# Patient Record
Sex: MALE | Race: WHITE | NOT HISPANIC OR LATINO | Employment: OTHER | URBAN - METROPOLITAN AREA
[De-identification: names, ages, dates, MRNs, and addresses within clinical notes are randomized per-mention and may not be internally consistent; named-entity substitution may affect disease eponyms.]

---

## 2017-04-10 PROBLEM — I50.32 CHRONIC DIASTOLIC CHF (CONGESTIVE HEART FAILURE): Status: ACTIVE | Noted: 2017-04-10

## 2017-04-10 PROBLEM — G47.33 OBSTRUCTIVE SLEEP APNEA: Status: ACTIVE | Noted: 2017-04-10

## 2017-04-10 PROBLEM — E78.5 DYSLIPIDEMIA: Status: ACTIVE | Noted: 2017-04-10

## 2017-04-10 PROBLEM — E11.9 TYPE 2 DIABETES MELLITUS: Status: ACTIVE | Noted: 2017-04-10

## 2017-04-10 PROBLEM — I25.10 CORONARY ARTERY DISEASE: Status: ACTIVE | Noted: 2017-04-10

## 2017-04-10 PROBLEM — I10 ESSENTIAL (PRIMARY) HYPERTENSION: Status: ACTIVE | Noted: 2017-04-10

## 2017-04-10 PROBLEM — T31.10 BURN (ANY DEGREE) INVOLVING 10-19% OF BODY SURFACE: Status: ACTIVE | Noted: 2017-04-10

## 2017-04-10 PROBLEM — Z79.01 ANTICOAGULANT LONG-TERM USE: Status: ACTIVE | Noted: 2017-04-10

## 2017-04-10 PROBLEM — L03.116 CELLULITIS OF LEFT FOOT: Status: ACTIVE | Noted: 2017-04-10

## 2017-04-10 PROBLEM — I48.91 ATRIAL FIBRILLATION: Status: ACTIVE | Noted: 2017-04-10

## 2017-04-10 PROBLEM — E66.01 MORBID OBESITY WITH BMI OF 40.0-44.9, ADULT: Status: ACTIVE | Noted: 2017-04-10

## 2017-09-07 PROBLEM — L60.0 INGROWN LEFT GREATER TOENAIL: Status: ACTIVE | Noted: 2017-09-07

## 2017-09-13 PROBLEM — T81.89XA NONHEALING SURGICAL WOUND: Status: ACTIVE | Noted: 2017-09-13

## 2017-09-14 PROBLEM — I89.0 LYMPHEDEMA OF BOTH LOWER EXTREMITIES: Status: ACTIVE | Noted: 2017-09-14

## 2017-09-14 PROBLEM — T81.89XA NON-HEALING SURGICAL WOUND: Status: ACTIVE | Noted: 2017-09-14

## 2017-12-28 PROBLEM — I50.33 DIASTOLIC DYSFUNCTION WITH ACUTE ON CHRONIC HEART FAILURE: Status: ACTIVE | Noted: 2017-04-10

## 2017-12-28 PROBLEM — R06.00 DYSPNEA: Status: ACTIVE | Noted: 2017-12-28

## 2017-12-28 PROBLEM — J45.42 MODERATE PERSISTENT ASTHMA WITH STATUS ASTHMATICUS: Status: ACTIVE | Noted: 2017-12-28

## 2018-04-18 PROBLEM — R05.9 COUGH: Status: ACTIVE | Noted: 2018-04-18

## 2018-05-14 PROBLEM — L60.0 INGROWN LEFT GREATER TOENAIL: Status: RESOLVED | Noted: 2017-09-07 | Resolved: 2018-05-14

## 2018-05-14 PROBLEM — L03.116 CELLULITIS OF LEFT FOOT: Status: RESOLVED | Noted: 2017-04-10 | Resolved: 2018-05-14

## 2018-05-14 PROBLEM — T81.89XA NONHEALING SURGICAL WOUND: Status: RESOLVED | Noted: 2017-09-13 | Resolved: 2018-05-14

## 2018-05-14 PROBLEM — T31.10 BURN (ANY DEGREE) INVOLVING 10-19% OF BODY SURFACE: Status: RESOLVED | Noted: 2017-04-10 | Resolved: 2018-05-14

## 2018-05-14 PROBLEM — T81.89XA NON-HEALING SURGICAL WOUND: Status: RESOLVED | Noted: 2017-09-14 | Resolved: 2018-05-14

## 2018-06-27 PROBLEM — T50.905A DRUG-INDUCED GYNECOMASTIA: Status: ACTIVE | Noted: 2018-06-27

## 2018-06-27 PROBLEM — N62 DRUG-INDUCED GYNECOMASTIA: Status: ACTIVE | Noted: 2018-06-27

## 2018-10-16 PROBLEM — R63.8 ALTERATION IN NUTRITION: Status: ACTIVE | Noted: 2018-10-16

## 2018-11-05 PROBLEM — J45.52 SEVERE PERSISTENT ASTHMA WITH STATUS ASTHMATICUS: Status: ACTIVE | Noted: 2017-12-28

## 2018-11-05 PROBLEM — R06.09 DYSPNEA ON EXERTION: Status: ACTIVE | Noted: 2018-11-05

## 2018-11-12 PROBLEM — R93.89 ABNORMAL CXR: Status: ACTIVE | Noted: 2018-11-12

## 2018-11-14 PROBLEM — D64.9 NORMOCYTIC ANEMIA: Status: ACTIVE | Noted: 2018-11-14

## 2018-11-21 PROBLEM — R79.9 ELEVATED BUN: Status: ACTIVE | Noted: 2018-11-21

## 2018-11-23 PROBLEM — J45.52 SEVERE PERSISTENT ASTHMA WITH STATUS ASTHMATICUS: Status: RESOLVED | Noted: 2017-12-28 | Resolved: 2018-11-23

## 2019-01-03 PROBLEM — R79.9 ELEVATED BUN: Status: RESOLVED | Noted: 2018-11-21 | Resolved: 2019-01-03

## 2019-01-03 PROBLEM — R93.89 ABNORMAL CXR: Status: RESOLVED | Noted: 2018-11-12 | Resolved: 2019-01-03

## 2019-01-09 PROBLEM — J45.902 STATUS ASTHMATICUS: Status: ACTIVE | Noted: 2019-01-09

## 2019-01-11 PROBLEM — J20.9 ACUTE BRONCHITIS WITH BRONCHOSPASM: Status: ACTIVE | Noted: 2019-01-09

## 2019-01-31 PROBLEM — R63.8 ALTERATION IN NUTRITION: Status: RESOLVED | Noted: 2018-10-16 | Resolved: 2019-01-31

## 2019-04-23 PROBLEM — J45.41 MODERATE PERSISTENT ASTHMA WITH EXACERBATION: Status: ACTIVE | Noted: 2019-04-23

## 2019-04-30 PROBLEM — R53.1 WEAKNESS: Status: ACTIVE | Noted: 2019-04-30

## 2019-05-21 PROBLEM — E11.42 TYPE 2 DIABETES MELLITUS WITH DIABETIC POLYNEUROPATHY, WITH LONG-TERM CURRENT USE OF INSULIN: Status: ACTIVE | Noted: 2017-04-10

## 2019-05-21 PROBLEM — Z79.4 TYPE 2 DIABETES MELLITUS WITH DIABETIC POLYNEUROPATHY, WITH LONG-TERM CURRENT USE OF INSULIN: Status: ACTIVE | Noted: 2017-04-10

## 2019-05-29 PROBLEM — R53.1 WEAKNESS: Status: RESOLVED | Noted: 2019-04-30 | Resolved: 2019-05-29

## 2019-05-29 PROBLEM — R06.00 DYSPNEA: Status: RESOLVED | Noted: 2017-12-28 | Resolved: 2019-05-29

## 2019-06-12 PROBLEM — J45.901 EXACERBATION OF ASTHMA: Status: ACTIVE | Noted: 2019-06-12

## 2019-07-22 PROBLEM — B37.2 CUTANEOUS CANDIDIASIS: Status: ACTIVE | Noted: 2019-07-22

## 2019-08-06 PROBLEM — J96.02 ACUTE RESPIRATORY FAILURE WITH HYPERCAPNIA: Status: ACTIVE | Noted: 2019-08-06

## 2019-08-07 PROBLEM — L08.1 ERYTHRASMA: Status: ACTIVE | Noted: 2019-08-07

## 2019-09-03 PROBLEM — R13.10 DYSPHAGIA: Status: ACTIVE | Noted: 2019-09-03

## 2019-09-03 PROBLEM — J45.901 ASTHMA WITH ACUTE EXACERBATION: Status: ACTIVE | Noted: 2019-09-03

## 2019-09-04 PROBLEM — R93.3 ABNORMAL BARIUM SWALLOW: Status: ACTIVE | Noted: 2019-09-04

## 2019-09-04 PROBLEM — R06.03 ACUTE RESPIRATORY DISTRESS: Status: ACTIVE | Noted: 2019-09-04

## 2019-09-05 PROBLEM — J45.901 ASTHMA WITH ACUTE EXACERBATION: Status: ACTIVE | Noted: 2019-09-05

## 2019-09-06 PROBLEM — E87.70 VOLUME OVERLOAD: Status: ACTIVE | Noted: 2019-09-06

## 2019-09-06 PROBLEM — E66.2 OBESITY HYPOVENTILATION SYNDROME: Status: ACTIVE | Noted: 2019-09-06

## 2019-09-09 PROBLEM — R06.03 ACUTE RESPIRATORY DISTRESS: Status: RESOLVED | Noted: 2019-09-04 | Resolved: 2019-09-09

## 2019-09-09 PROBLEM — R06.09 DYSPNEA ON EXERTION: Status: RESOLVED | Noted: 2018-11-05 | Resolved: 2019-09-09

## 2019-09-09 PROBLEM — J20.9 ACUTE BRONCHITIS WITH BRONCHOSPASM: Status: RESOLVED | Noted: 2019-01-09 | Resolved: 2019-09-09

## 2019-09-09 PROBLEM — J45.901 EXACERBATION OF ASTHMA: Status: RESOLVED | Noted: 2019-06-12 | Resolved: 2019-09-09

## 2019-09-09 PROBLEM — B37.2 CUTANEOUS CANDIDIASIS: Status: RESOLVED | Noted: 2019-07-22 | Resolved: 2019-09-09

## 2019-09-09 PROBLEM — J96.02 ACUTE RESPIRATORY FAILURE WITH HYPERCAPNIA: Status: RESOLVED | Noted: 2019-08-06 | Resolved: 2019-09-09

## 2019-09-09 PROBLEM — R63.8 ALTERATION IN NUTRITION: Status: RESOLVED | Noted: 2018-10-16 | Resolved: 2019-09-09

## 2019-09-09 PROBLEM — E87.70 VOLUME OVERLOAD: Status: RESOLVED | Noted: 2019-09-06 | Resolved: 2019-09-09

## 2019-09-09 PROBLEM — L08.1 ERYTHRASMA: Status: RESOLVED | Noted: 2019-08-07 | Resolved: 2019-09-09

## 2019-09-09 PROBLEM — J45.901 ASTHMA WITH ACUTE EXACERBATION: Status: RESOLVED | Noted: 2019-09-05 | Resolved: 2019-09-09

## 2019-09-20 PROBLEM — I48.20 CHRONIC ATRIAL FIBRILLATION: Status: ACTIVE | Noted: 2017-04-10

## 2019-09-24 PROBLEM — N28.9 ACUTE RENAL INSUFFICIENCY: Status: ACTIVE | Noted: 2019-09-24

## 2019-09-24 PROBLEM — R06.02 SOB (SHORTNESS OF BREATH): Status: ACTIVE | Noted: 2019-09-24

## 2019-09-24 PROBLEM — R06.03 RESPIRATORY DISTRESS: Status: ACTIVE | Noted: 2019-09-24

## 2019-09-30 PROBLEM — I50.33 DIASTOLIC DYSFUNCTION WITH ACUTE ON CHRONIC HEART FAILURE: Chronic | Status: ACTIVE | Noted: 2017-04-10

## 2019-09-30 PROBLEM — I10 ESSENTIAL (PRIMARY) HYPERTENSION: Chronic | Status: ACTIVE | Noted: 2017-04-10

## 2019-09-30 PROBLEM — G47.33 OBSTRUCTIVE SLEEP APNEA: Chronic | Status: ACTIVE | Noted: 2017-04-10

## 2019-09-30 PROBLEM — E78.5 DYSLIPIDEMIA: Chronic | Status: ACTIVE | Noted: 2017-04-10

## 2019-09-30 PROBLEM — Z79.4 TYPE 2 DIABETES MELLITUS WITH DIABETIC POLYNEUROPATHY, WITH LONG-TERM CURRENT USE OF INSULIN: Chronic | Status: ACTIVE | Noted: 2017-04-10

## 2019-09-30 PROBLEM — J45.41 MODERATE PERSISTENT ASTHMA WITH EXACERBATION: Chronic | Status: ACTIVE | Noted: 2019-04-23

## 2019-09-30 PROBLEM — I89.0 LYMPHEDEMA OF BOTH LOWER EXTREMITIES: Chronic | Status: ACTIVE | Noted: 2017-09-14

## 2019-09-30 PROBLEM — E66.01 MORBID OBESITY WITH BODY MASS INDEX (BMI) OF 50.0 TO 59.9 IN ADULT: Chronic | Status: ACTIVE | Noted: 2017-04-10

## 2019-09-30 PROBLEM — E11.42 TYPE 2 DIABETES MELLITUS WITH DIABETIC POLYNEUROPATHY, WITH LONG-TERM CURRENT USE OF INSULIN: Chronic | Status: ACTIVE | Noted: 2017-04-10

## 2019-10-02 PROBLEM — J45.40 MODERATE PERSISTENT ASTHMA WITHOUT COMPLICATION: Status: ACTIVE | Noted: 2019-04-23

## 2019-10-14 ENCOUNTER — HOSPITAL ENCOUNTER (OUTPATIENT)
Facility: HOSPITAL | Age: 59
Discharge: HOME | End: 2019-10-15
Attending: SURGERY | Admitting: INTERNAL MEDICINE
Payer: MEDICARE

## 2019-10-14 ENCOUNTER — TELEPHONE (OUTPATIENT)
Dept: GASTROENTEROLOGY | Facility: CLINIC | Age: 59
End: 2019-10-14

## 2019-10-14 DIAGNOSIS — J44.1 COPD WITH ACUTE EXACERBATION: ICD-10-CM

## 2019-10-14 DIAGNOSIS — R07.89 CHEST WALL PAIN: Primary | ICD-10-CM

## 2019-10-14 DIAGNOSIS — J96.12 CHRONIC RESPIRATORY FAILURE WITH HYPOXIA AND HYPERCAPNIA: ICD-10-CM

## 2019-10-14 DIAGNOSIS — I50.33 DIASTOLIC DYSFUNCTION WITH ACUTE ON CHRONIC HEART FAILURE: Chronic | ICD-10-CM

## 2019-10-14 DIAGNOSIS — I25.10 CARDIOVASCULAR DISEASE: ICD-10-CM

## 2019-10-14 DIAGNOSIS — R79.89 ELEVATED TROPONIN: ICD-10-CM

## 2019-10-14 DIAGNOSIS — E66.2 OBESITY HYPOVENTILATION SYNDROME: ICD-10-CM

## 2019-10-14 DIAGNOSIS — R06.02 SOB (SHORTNESS OF BREATH): ICD-10-CM

## 2019-10-14 DIAGNOSIS — J96.11 CHRONIC RESPIRATORY FAILURE WITH HYPOXIA AND HYPERCAPNIA: ICD-10-CM

## 2019-10-14 PROBLEM — E66.01 MORBID OBESITY: Status: ACTIVE | Noted: 2019-09-06

## 2019-10-14 LAB
ALBUMIN SERPL BCP-MCNC: 3.4 G/DL (ref 3.5–5.2)
ALLENS TEST: ABNORMAL
ALP SERPL-CCNC: 61 U/L (ref 55–135)
ALT SERPL W/O P-5'-P-CCNC: 18 U/L (ref 10–44)
ANION GAP SERPL CALC-SCNC: 9 MMOL/L (ref 8–16)
APTT BLDCRRT: 30.3 SEC (ref 21–32)
AST SERPL-CCNC: 14 U/L (ref 10–40)
BASOPHILS # BLD AUTO: 0.03 K/UL (ref 0–0.2)
BASOPHILS NFR BLD: 0.4 % (ref 0–1.9)
BILIRUB SERPL-MCNC: 0.5 MG/DL (ref 0.1–1)
BNP SERPL-MCNC: 49 PG/ML (ref 0–99)
BUN SERPL-MCNC: 17 MG/DL (ref 6–20)
CALCIUM SERPL-MCNC: 8.7 MG/DL (ref 8.7–10.5)
CHLORIDE SERPL-SCNC: 104 MMOL/L (ref 95–110)
CK MB SERPL-MCNC: 1.5 NG/ML (ref 0.1–6.5)
CK MB SERPL-MCNC: 1.7 NG/ML (ref 0.1–6.5)
CK MB SERPL-RTO: 2.3 % (ref 0–5)
CK MB SERPL-RTO: 2.4 % (ref 0–5)
CK SERPL-CCNC: 62 U/L (ref 20–200)
CK SERPL-CCNC: 62 U/L (ref 20–200)
CK SERPL-CCNC: 73 U/L (ref 20–200)
CK SERPL-CCNC: 73 U/L (ref 20–200)
CO2 SERPL-SCNC: 29 MMOL/L (ref 23–29)
CREAT SERPL-MCNC: 0.9 MG/DL (ref 0.5–1.4)
D DIMER PPP IA.FEU-MCNC: 0.32 MG/L FEU
DELSYS: ABNORMAL
DIFFERENTIAL METHOD: ABNORMAL
EOSINOPHIL # BLD AUTO: 0.1 K/UL (ref 0–0.5)
EOSINOPHIL NFR BLD: 1 % (ref 0–8)
ERYTHROCYTE [DISTWIDTH] IN BLOOD BY AUTOMATED COUNT: 14.8 % (ref 11.5–14.5)
EST. GFR  (AFRICAN AMERICAN): >60 ML/MIN/1.73 M^2
EST. GFR  (NON AFRICAN AMERICAN): >60 ML/MIN/1.73 M^2
GLUCOSE SERPL-MCNC: 171 MG/DL (ref 70–110)
HCO3 UR-SCNC: 34.5 MMOL/L (ref 22–26)
HCT VFR BLD AUTO: 37.3 % (ref 40–54)
HGB BLD-MCNC: 11.5 G/DL (ref 14–18)
IMM GRANULOCYTES # BLD AUTO: 0.07 K/UL (ref 0–0.04)
IMM GRANULOCYTES NFR BLD AUTO: 0.9 % (ref 0–0.5)
INR PPP: 1 (ref 0.8–1.2)
LYMPHOCYTES # BLD AUTO: 1.8 K/UL (ref 1–4.8)
LYMPHOCYTES NFR BLD: 23.4 % (ref 18–48)
MCH RBC QN AUTO: 25.8 PG (ref 27–31)
MCHC RBC AUTO-ENTMCNC: 30.8 G/DL (ref 32–36)
MCV RBC AUTO: 84 FL (ref 82–98)
MONOCYTES # BLD AUTO: 0.5 K/UL (ref 0.3–1)
MONOCYTES NFR BLD: 6.5 % (ref 4–15)
NEUTROPHILS # BLD AUTO: 5.3 K/UL (ref 1.8–7.7)
NEUTROPHILS NFR BLD: 67.8 % (ref 38–73)
NRBC BLD-RTO: 0 /100 WBC
PCO2 BLDA: 64 MMHG (ref 35–45)
PH SMN: 7.34 [PH] (ref 7.35–7.45)
PLATELET # BLD AUTO: 224 K/UL (ref 150–350)
PMV BLD AUTO: 10.5 FL (ref 9.2–12.9)
PO2 BLDA: 24 MMHG (ref 75–100)
POC BE: 7 MMOL/L (ref -2–2)
POC COHB: 1.2 % (ref 0–3)
POC METHB: 0.1 % (ref 0–1.5)
POC O2HB ARTERIAL: 41.1 % (ref 94–100)
POC SATURATED O2: 41.6 % (ref 90–100)
POC TCO2: 36.5 MMOL/L
POC THB: 11.1 G/DL (ref 12–18)
POCT GLUCOSE: 138 MG/DL (ref 70–110)
POCT GLUCOSE: 258 MG/DL (ref 70–110)
POTASSIUM SERPL-SCNC: 3.8 MMOL/L (ref 3.5–5.1)
PROT SERPL-MCNC: 6.6 G/DL (ref 6–8.4)
PROTHROMBIN TIME: 10.3 SEC (ref 9–12.5)
RBC # BLD AUTO: 4.45 M/UL (ref 4.6–6.2)
SITE: ABNORMAL
SODIUM SERPL-SCNC: 142 MMOL/L (ref 136–145)
TROPONIN I SERPL DL<=0.01 NG/ML-MCNC: 0.12 NG/ML (ref 0–0.03)
TROPONIN I SERPL DL<=0.01 NG/ML-MCNC: 0.16 NG/ML (ref 0–0.03)
TROPONIN I SERPL DL<=0.01 NG/ML-MCNC: 0.19 NG/ML (ref 0–0.03)
WBC # BLD AUTO: 7.85 K/UL (ref 3.9–12.7)

## 2019-10-14 PROCEDURE — 85610 PROTHROMBIN TIME: CPT

## 2019-10-14 PROCEDURE — 27000221 HC OXYGEN, UP TO 24 HOURS

## 2019-10-14 PROCEDURE — 84484 ASSAY OF TROPONIN QUANT: CPT

## 2019-10-14 PROCEDURE — 96372 THER/PROPH/DIAG INJ SC/IM: CPT | Mod: 59

## 2019-10-14 PROCEDURE — 36600 WITHDRAWAL OF ARTERIAL BLOOD: CPT

## 2019-10-14 PROCEDURE — 36415 COLL VENOUS BLD VENIPUNCTURE: CPT

## 2019-10-14 PROCEDURE — 85379 FIBRIN DEGRADATION QUANT: CPT

## 2019-10-14 PROCEDURE — 94640 AIRWAY INHALATION TREATMENT: CPT

## 2019-10-14 PROCEDURE — 85025 COMPLETE CBC W/AUTO DIFF WBC: CPT

## 2019-10-14 PROCEDURE — 83880 ASSAY OF NATRIURETIC PEPTIDE: CPT

## 2019-10-14 PROCEDURE — 82550 ASSAY OF CK (CPK): CPT | Mod: 91

## 2019-10-14 PROCEDURE — 63600175 PHARM REV CODE 636 W HCPCS: Performed by: SURGERY

## 2019-10-14 PROCEDURE — 80053 COMPREHEN METABOLIC PANEL: CPT

## 2019-10-14 PROCEDURE — 25000003 PHARM REV CODE 250: Performed by: SURGERY

## 2019-10-14 PROCEDURE — 96374 THER/PROPH/DIAG INJ IV PUSH: CPT

## 2019-10-14 PROCEDURE — C9399 UNCLASSIFIED DRUGS OR BIOLOG: HCPCS | Performed by: INTERNAL MEDICINE

## 2019-10-14 PROCEDURE — 85730 THROMBOPLASTIN TIME PARTIAL: CPT

## 2019-10-14 PROCEDURE — 93010 EKG 12-LEAD: ICD-10-PCS | Mod: ,,, | Performed by: INTERNAL MEDICINE

## 2019-10-14 PROCEDURE — 25000242 PHARM REV CODE 250 ALT 637 W/ HCPCS: Performed by: SURGERY

## 2019-10-14 PROCEDURE — 25000003 PHARM REV CODE 250: Performed by: INTERNAL MEDICINE

## 2019-10-14 PROCEDURE — 93010 ELECTROCARDIOGRAM REPORT: CPT | Mod: ,,, | Performed by: INTERNAL MEDICINE

## 2019-10-14 PROCEDURE — G0378 HOSPITAL OBSERVATION PER HR: HCPCS

## 2019-10-14 PROCEDURE — 99220 PR INITIAL OBSERVATION CARE,LEVL III: ICD-10-PCS | Mod: ,,, | Performed by: INTERNAL MEDICINE

## 2019-10-14 PROCEDURE — 82553 CREATINE MB FRACTION: CPT | Mod: 91

## 2019-10-14 PROCEDURE — 82803 BLOOD GASES ANY COMBINATION: CPT | Performed by: SURGERY

## 2019-10-14 PROCEDURE — 25000242 PHARM REV CODE 250 ALT 637 W/ HCPCS: Performed by: INTERNAL MEDICINE

## 2019-10-14 PROCEDURE — 93005 ELECTROCARDIOGRAM TRACING: CPT

## 2019-10-14 PROCEDURE — 99285 EMERGENCY DEPT VISIT HI MDM: CPT | Mod: 25

## 2019-10-14 PROCEDURE — 63600175 PHARM REV CODE 636 W HCPCS: Performed by: INTERNAL MEDICINE

## 2019-10-14 PROCEDURE — 94761 N-INVAS EAR/PLS OXIMETRY MLT: CPT

## 2019-10-14 PROCEDURE — 99220 PR INITIAL OBSERVATION CARE,LEVL III: CPT | Mod: ,,, | Performed by: INTERNAL MEDICINE

## 2019-10-14 RX ORDER — HYDRALAZINE HYDROCHLORIDE 50 MG/1
50 TABLET, FILM COATED ORAL EVERY 12 HOURS
Status: DISCONTINUED | OUTPATIENT
Start: 2019-10-14 | End: 2019-10-15 | Stop reason: HOSPADM

## 2019-10-14 RX ORDER — HYDROCODONE BITARTRATE AND ACETAMINOPHEN 5; 325 MG/1; MG/1
1 TABLET ORAL EVERY 4 HOURS PRN
Status: DISCONTINUED | OUTPATIENT
Start: 2019-10-14 | End: 2019-10-15 | Stop reason: HOSPADM

## 2019-10-14 RX ORDER — GLUCAGON 1 MG
1 KIT INJECTION
Status: DISCONTINUED | OUTPATIENT
Start: 2019-10-14 | End: 2019-10-14 | Stop reason: SDUPTHER

## 2019-10-14 RX ORDER — HYDROMORPHONE HYDROCHLORIDE 1 MG/ML
2 INJECTION, SOLUTION INTRAMUSCULAR; INTRAVENOUS; SUBCUTANEOUS
Status: DISCONTINUED | OUTPATIENT
Start: 2019-10-14 | End: 2019-10-14

## 2019-10-14 RX ORDER — CARVEDILOL 12.5 MG/1
12.5 TABLET ORAL 2 TIMES DAILY
Status: DISCONTINUED | OUTPATIENT
Start: 2019-10-14 | End: 2019-10-15 | Stop reason: HOSPADM

## 2019-10-14 RX ORDER — KETOROLAC TROMETHAMINE 30 MG/ML
30 INJECTION, SOLUTION INTRAMUSCULAR; INTRAVENOUS
Status: COMPLETED | OUTPATIENT
Start: 2019-10-14 | End: 2019-10-14

## 2019-10-14 RX ORDER — FLUTICASONE FUROATE AND VILANTEROL 200; 25 UG/1; UG/1
1 POWDER RESPIRATORY (INHALATION) DAILY
Status: DISCONTINUED | OUTPATIENT
Start: 2019-10-15 | End: 2019-10-15 | Stop reason: HOSPADM

## 2019-10-14 RX ORDER — IPRATROPIUM BROMIDE AND ALBUTEROL SULFATE 2.5; .5 MG/3ML; MG/3ML
3 SOLUTION RESPIRATORY (INHALATION) EVERY 4 HOURS
Status: DISCONTINUED | OUTPATIENT
Start: 2019-10-14 | End: 2019-10-14 | Stop reason: SDUPTHER

## 2019-10-14 RX ORDER — ATORVASTATIN CALCIUM 40 MG/1
40 TABLET, FILM COATED ORAL
Status: COMPLETED | OUTPATIENT
Start: 2019-10-14 | End: 2019-10-14

## 2019-10-14 RX ORDER — ASPIRIN 325 MG
325 TABLET ORAL
Status: COMPLETED | OUTPATIENT
Start: 2019-10-14 | End: 2019-10-14

## 2019-10-14 RX ORDER — INSULIN ASPART 100 [IU]/ML
0-5 INJECTION, SOLUTION INTRAVENOUS; SUBCUTANEOUS
Status: DISCONTINUED | OUTPATIENT
Start: 2019-10-14 | End: 2019-10-14 | Stop reason: SDUPTHER

## 2019-10-14 RX ORDER — ONDANSETRON 2 MG/ML
4 INJECTION INTRAMUSCULAR; INTRAVENOUS
Status: COMPLETED | OUTPATIENT
Start: 2019-10-14 | End: 2019-10-14

## 2019-10-14 RX ORDER — SODIUM CHLORIDE 0.9 % (FLUSH) 0.9 %
10 SYRINGE (ML) INJECTION
Status: DISCONTINUED | OUTPATIENT
Start: 2019-10-14 | End: 2019-10-15 | Stop reason: HOSPADM

## 2019-10-14 RX ORDER — IPRATROPIUM BROMIDE AND ALBUTEROL SULFATE 2.5; .5 MG/3ML; MG/3ML
3 SOLUTION RESPIRATORY (INHALATION) EVERY 4 HOURS
Status: DISCONTINUED | OUTPATIENT
Start: 2019-10-14 | End: 2019-10-15 | Stop reason: HOSPADM

## 2019-10-14 RX ORDER — ONDANSETRON 2 MG/ML
4 INJECTION INTRAMUSCULAR; INTRAVENOUS EVERY 8 HOURS PRN
Status: DISCONTINUED | OUTPATIENT
Start: 2019-10-14 | End: 2019-10-15 | Stop reason: HOSPADM

## 2019-10-14 RX ORDER — INSULIN ASPART 100 [IU]/ML
10 INJECTION, SOLUTION INTRAVENOUS; SUBCUTANEOUS
Status: DISCONTINUED | OUTPATIENT
Start: 2019-10-15 | End: 2019-10-14 | Stop reason: SDUPTHER

## 2019-10-14 RX ORDER — IBUPROFEN 200 MG
16 TABLET ORAL
Status: DISCONTINUED | OUTPATIENT
Start: 2019-10-14 | End: 2019-10-14 | Stop reason: SDUPTHER

## 2019-10-14 RX ORDER — KETOROLAC TROMETHAMINE 30 MG/ML
30 INJECTION, SOLUTION INTRAMUSCULAR; INTRAVENOUS
Status: DISCONTINUED | OUTPATIENT
Start: 2019-10-14 | End: 2019-10-14

## 2019-10-14 RX ORDER — PANTOPRAZOLE SODIUM 40 MG/1
40 TABLET, DELAYED RELEASE ORAL DAILY
Status: DISCONTINUED | OUTPATIENT
Start: 2019-10-15 | End: 2019-10-15 | Stop reason: HOSPADM

## 2019-10-14 RX ORDER — INSULIN ASPART 100 [IU]/ML
10 INJECTION, SOLUTION INTRAVENOUS; SUBCUTANEOUS
Status: DISCONTINUED | OUTPATIENT
Start: 2019-10-15 | End: 2019-10-15 | Stop reason: HOSPADM

## 2019-10-14 RX ORDER — ACETAMINOPHEN 325 MG/1
650 TABLET ORAL EVERY 8 HOURS PRN
Status: DISCONTINUED | OUTPATIENT
Start: 2019-10-14 | End: 2019-10-15 | Stop reason: HOSPADM

## 2019-10-14 RX ORDER — PRAVASTATIN SODIUM 40 MG/1
40 TABLET ORAL NIGHTLY
Status: DISCONTINUED | OUTPATIENT
Start: 2019-10-14 | End: 2019-10-15 | Stop reason: HOSPADM

## 2019-10-14 RX ORDER — ALBUTEROL SULFATE 2.5 MG/.5ML
2.5 SOLUTION RESPIRATORY (INHALATION) EVERY 4 HOURS
Status: DISCONTINUED | OUTPATIENT
Start: 2019-10-14 | End: 2019-10-14 | Stop reason: SDUPTHER

## 2019-10-14 RX ORDER — INDOMETHACIN 25 MG/1
50 CAPSULE ORAL
Status: DISCONTINUED | OUTPATIENT
Start: 2019-10-15 | End: 2019-10-15 | Stop reason: HOSPADM

## 2019-10-14 RX ORDER — ONDANSETRON 2 MG/ML
4 INJECTION INTRAMUSCULAR; INTRAVENOUS
Status: DISCONTINUED | OUTPATIENT
Start: 2019-10-14 | End: 2019-10-14

## 2019-10-14 RX ORDER — ALPRAZOLAM 0.5 MG/1
0.5 TABLET ORAL NIGHTLY PRN
Status: DISCONTINUED | OUTPATIENT
Start: 2019-10-14 | End: 2019-10-15 | Stop reason: HOSPADM

## 2019-10-14 RX ORDER — IPRATROPIUM BROMIDE AND ALBUTEROL SULFATE 2.5; .5 MG/3ML; MG/3ML
3 SOLUTION RESPIRATORY (INHALATION)
Status: COMPLETED | OUTPATIENT
Start: 2019-10-14 | End: 2019-10-14

## 2019-10-14 RX ORDER — IBUPROFEN 200 MG
24 TABLET ORAL
Status: DISCONTINUED | OUTPATIENT
Start: 2019-10-14 | End: 2019-10-14 | Stop reason: SDUPTHER

## 2019-10-14 RX ORDER — HYDROCODONE BITARTRATE AND ACETAMINOPHEN 7.5; 325 MG/1; MG/1
1 TABLET ORAL EVERY 6 HOURS PRN
Status: DISCONTINUED | OUTPATIENT
Start: 2019-10-14 | End: 2019-10-15 | Stop reason: HOSPADM

## 2019-10-14 RX ORDER — PANTOPRAZOLE SODIUM 40 MG/1
40 TABLET, DELAYED RELEASE ORAL
Status: COMPLETED | OUTPATIENT
Start: 2019-10-14 | End: 2019-10-14

## 2019-10-14 RX ORDER — FUROSEMIDE 40 MG/1
40 TABLET ORAL DAILY
Status: DISCONTINUED | OUTPATIENT
Start: 2019-10-15 | End: 2019-10-15 | Stop reason: HOSPADM

## 2019-10-14 RX ORDER — HYDROMORPHONE HYDROCHLORIDE 1 MG/ML
2 INJECTION, SOLUTION INTRAMUSCULAR; INTRAVENOUS; SUBCUTANEOUS
Status: COMPLETED | OUTPATIENT
Start: 2019-10-14 | End: 2019-10-14

## 2019-10-14 RX ORDER — CYCLOBENZAPRINE HCL 10 MG
10 TABLET ORAL EVERY 8 HOURS PRN
Status: DISCONTINUED | OUTPATIENT
Start: 2019-10-14 | End: 2019-10-15 | Stop reason: HOSPADM

## 2019-10-14 RX ORDER — NAPROXEN SODIUM 220 MG/1
81 TABLET, FILM COATED ORAL DAILY
Status: DISCONTINUED | OUTPATIENT
Start: 2019-10-15 | End: 2019-10-15 | Stop reason: HOSPADM

## 2019-10-14 RX ADMIN — HYDRALAZINE HYDROCHLORIDE 50 MG: 50 TABLET ORAL at 08:10

## 2019-10-14 RX ADMIN — INSULIN DETEMIR 30 UNITS: 100 INJECTION, SOLUTION SUBCUTANEOUS at 08:10

## 2019-10-14 RX ADMIN — KETOROLAC TROMETHAMINE 30 MG: 30 INJECTION, SOLUTION INTRAMUSCULAR at 01:10

## 2019-10-14 RX ADMIN — ONDANSETRON 4 MG: 2 INJECTION INTRAMUSCULAR; INTRAVENOUS at 03:10

## 2019-10-14 RX ADMIN — APIXABAN 5 MG: 5 TABLET, FILM COATED ORAL at 08:10

## 2019-10-14 RX ADMIN — IPRATROPIUM BROMIDE AND ALBUTEROL SULFATE 3 ML: .5; 3 SOLUTION RESPIRATORY (INHALATION) at 01:10

## 2019-10-14 RX ADMIN — CARVEDILOL 12.5 MG: 12.5 TABLET, FILM COATED ORAL at 08:10

## 2019-10-14 RX ADMIN — PRAVASTATIN SODIUM 40 MG: 40 TABLET ORAL at 08:10

## 2019-10-14 RX ADMIN — NITROGLYCERIN 1 INCH: 20 OINTMENT TOPICAL at 04:10

## 2019-10-14 RX ADMIN — HYDROCODONE BITARTRATE AND ACETAMINOPHEN 1 TABLET: 7.5; 325 TABLET ORAL at 08:10

## 2019-10-14 RX ADMIN — ATORVASTATIN CALCIUM 40 MG: 40 TABLET, FILM COATED ORAL at 04:10

## 2019-10-14 RX ADMIN — IPRATROPIUM BROMIDE AND ALBUTEROL SULFATE 3 ML: .5; 3 SOLUTION RESPIRATORY (INHALATION) at 07:10

## 2019-10-14 RX ADMIN — ASPIRIN 325 MG ORAL TABLET 325 MG: 325 PILL ORAL at 12:10

## 2019-10-14 RX ADMIN — HYDROMORPHONE HYDROCHLORIDE 2 MG: 1 INJECTION, SOLUTION INTRAMUSCULAR; INTRAVENOUS; SUBCUTANEOUS at 03:10

## 2019-10-14 RX ADMIN — PANTOPRAZOLE SODIUM 40 MG: 40 TABLET, DELAYED RELEASE ORAL at 04:10

## 2019-10-14 NOTE — CONSULTS
[facility]  Cardiology  Consult Note    Patient Name: Jean Chao  MRN: [id number]  Admission Date: 10/14/2019  Hospital Length of Stay: 0 days  Code Status: Prior   Attending Provider: Jonah Esposito MD   Consulting Provider: Lien Chi NP  Primary Care Physician: Yomi Smith MD  Principal Problem:<principal problem not specified>      Consults  Subjective:     Chief Complaint:  Back/left chest wall pain    HPI: Patient is a 59 year old male with a past medical history of paroxysmal atrial fibrillation, T2DM on insulin, JENNIFER, GERD, hypertension, obesity, and chronic diastolic HF, and chronic cough who presents to the ER with complaints of a left sided back pain that radiates to his lateral chest wall that began last night. He was seen in the ER several days prior with similar complaints. He states that the pain worsens with movement, coughing and deep inspiration. The pain appears to occur in cycles and worsens in intensity at times. EKG with no acute ischemic changes. Chest x ray normal sized heart, cardiac enzymes with mild troponin elevation .16 and normal total CK and CK-MB. Renal function stable. CIS asked to evaluate.     Past Medical History:   Diagnosis Date    Acid reflux     Anticoagulant long-term use     Eliquis for a-fib    Anxiety disorder     Atrial fibrillation     Back pain     Coronary artery disease     Diabetes     Diabetes mellitus     Diabetes mellitus, type 2     HTN (hypertension)     Hypercholesteremia     Hypertension     Migraine     Myocardial infarction     Respiratory distress     chronic cough    Sleep apnea        Past Surgical History:   Procedure Laterality Date    ANKLE SURGERY      ANKLE SURGERY Bilateral     ANTERIOR CERVICAL DISCECTOMY W/ FUSION      carpal tunnel both hands      ESOPHAGOGASTRODUODENOSCOPY N/A 9/5/2019    Procedure: EGD (ESOPHAGOGASTRODUODENOSCOPY);  Surgeon: Ciro Angulo MD;  Location: [facility];   Service: Endoscopy;  Laterality: N/A;    HERNIA REPAIR      NECK SURGERY      SHOULDER SURGERY      SHOULDER SURGERY Bilateral     UVULOPALATOPHARYNGOPLASTY      WRIST SURGERY Bilateral        Review of patient's allergies indicates:   Allergen Reactions    Spironolactone      gynecomatia       No current facility-administered medications on file prior to encounter.      Current Outpatient Medications on File Prior to Encounter   Medication Sig    albuterol (PROVENTIL) 2.5 mg /3 mL (0.083 %) nebulizer solution Take 2.5 mg by nebulization every 6 (six) hours as needed.    albuterol (PROVENTIL/VENTOLIN HFA) 90 mcg/actuation inhaler Inhale 2 puffs into the lungs every 6 (six) hours as needed for Wheezing (COUGH).    ALPRAZolam (XANAX) 0.5 MG tablet TAKE ONE TABLET BY MOUTH EVERY DAY AS NEEDED (Patient taking differently: Take 0.5 mg by mouth nightly as needed. )    apixaban (ELIQUIS) 5 mg Tab Take 5 mg by mouth 2 (two) times daily.    aspirin 81 MG Chew Take 81 mg by mouth once daily.    carvedilol (COREG) 12.5 MG tablet TAKE ONE TABLET BY MOUTH TWICE DAILY (Patient taking differently: Take 12.5 mg by mouth 2 (two) times daily. )    cyclobenzaprine (FLEXERIL) 10 MG tablet Take 1 tablet (10 mg total) by mouth every 8 (eight) hours as needed for Muscle spasms.    fluticasone-vilanterol (BREO ELLIPTA) 200-25 mcg/dose DsDv diskus inhaler Inhale 1 puff into the lungs once daily. Controller    furosemide (LASIX) 40 MG tablet Take 1 tablet (40 mg total) by mouth once daily.    hydrALAZINE (APRESOLINE) 50 MG tablet Take 1 tablet (50 mg total) by mouth every 12 (twelve) hours.    HYDROcodone-acetaminophen (NORCO) 7.5-325 mg per tablet Take 1 tablet by mouth every 12 (twelve) hours as needed for Pain.    insulin glargine (LANTUS U-100 INSULIN) 100 unit/mL injection INJECT 63 UNITS UNDER SKIN TWICE DAILY (Patient taking differently: Inject 60 Units into the skin 2 (two) times daily. )    insulin lispro  (HUMALOG U-100 INSULIN) 100 unit/mL injection INJECT 30 UNITS INTO THE SKIN THREE TIMES DAILY BEFORE MEALS (Patient taking differently: Inject 30 Units into the skin 3 (three) times daily before meals. )    insulin syringe-needle U-100 1 mL 31 gauge x 5/16 Syrg USE AS DIRECTED 5 times a day    metFORMIN (GLUCOPHAGE) 1000 MG tablet Take 1,000 mg by mouth 2 (two) times daily with meals.    montelukast (SINGULAIR) 10 mg tablet TAKE ONE TABLET BY MOUTH EVERY EVENING (Patient taking differently: Take 10 mg by mouth every evening. )    pantoprazole (PROTONIX) 40 MG tablet TAKE ONE TABLET BY MOUTH EVERY MORNING (Patient taking differently: Take 40 mg by mouth 2 (two) times daily. )    pravastatin (PRAVACHOL) 40 MG tablet Take 1 tablet (40 mg total) by mouth nightly.     Family History     Problem Relation (Age of Onset)    Cancer Father    No Known Problems Mother        Tobacco Use    Smoking status: Never Smoker    Smokeless tobacco: Never Used   Substance and Sexual Activity    Alcohol use: Yes     Comment: seldom    Drug use: Yes     Types: Hydrocodone    Sexual activity: Not Currently     ROS     Constitutional : Negative  EENT : Negative  CV : Negative  Respiratory : Negative  Gastrointestinal: Negative   Genitourinary: Negative  Musculoskeletal: Back and chest wall pain  Skin : Negative  Neurological : AAO x 3     Objective:     Vital Signs (Most Recent):  Temp: 98.8 °F (37.1 °C) (10/14/19 1237)  Pulse: 64 (10/14/19 1317)  Resp: (!) 22 (10/14/19 1317)  BP: (!) 158/91 (10/14/19 1237)  SpO2: 98 % (10/14/19 1317) Vital Signs (24h Range):  Temp:  [98.8 °F (37.1 °C)] 98.8 °F (37.1 °C)  Pulse:  [64-69] 64  Resp:  [20-22] 22  SpO2:  [97 %-98 %] 98 %  BP: (158)/(91) 158/91     Weight: (!) 178.3 kg (393 lb)  Body mass index is 58.04 kg/m².    SpO2: 98 %  O2 Device (Oxygen Therapy): room air    No intake or output data in the 24 hours ending 10/14/19 1512    Lines/Drains/Airways     Peripheral Intravenous Line                  Peripheral IV - Single Lumen 10/14/19 1509 20 G Right Antecubital less than 1 day                Physical Exam     General appearance: alert, appears stated age and cooperative  Head: Normocephalic, without obvious abnormality, atraumatic  Eyes: conjunctivae/corneas clear. PERRL  Neck: no carotid bruit, no JVD and supple, symmetrical, trachea midline  Lungs: clear to auscultation bilaterally, normal respiratory effort  Chest Wall: no tenderness  Heart: regular rate and rhythm, S1, S2 normal, no murmur, click, rub or gallop  Abdomen: soft, non-tender; bowel sounds normal; no masses,  no organomegaly  Extremities: Extremities normal, atraumatic, no cyanosis, clubbing, or edema  Pulses: Dorsalis Pedis R: 2+ (normal)/L: 2+ (normal)  Skin: Skin color, texture, turgor normal. No rashes or lesions  Neurologic: Normal mood and affect  Alert and oriented X 3    Significant Labs:   BMP:   Recent Labs   Lab 10/14/19  1252   *      K 3.8      CO2 29   BUN 17   CREATININE 0.9   CALCIUM 8.7   , CMP   Recent Labs   Lab 10/14/19  1252      K 3.8      CO2 29   *   BUN 17   CREATININE 0.9   CALCIUM 8.7   PROT 6.6   ALBUMIN 3.4*   BILITOT 0.5   ALKPHOS 61   AST 14   ALT 18   ANIONGAP 9   ESTGFRAFRICA >60   EGFRNONAA >60   , CBC   Recent Labs   Lab 10/14/19  1252   WBC 7.85   HGB 11.5*   HCT 37.3*       and Troponin   Recent Labs   Lab 10/14/19  1252   TROPONINI 0.161*       Assessment and Plan:     There are no hospital problems to display for this patient.      VTE Risk Mitigation (From admission, onward)    None        No current facility-administered medications for this encounter.      Current Outpatient Medications   Medication Sig    albuterol (PROVENTIL) 2.5 mg /3 mL (0.083 %) nebulizer solution Take 2.5 mg by nebulization every 6 (six) hours as needed.    albuterol (PROVENTIL/VENTOLIN HFA) 90 mcg/actuation inhaler Inhale 2 puffs into the lungs every 6 (six) hours as  needed for Wheezing (COUGH).    ALPRAZolam (XANAX) 0.5 MG tablet TAKE ONE TABLET BY MOUTH EVERY DAY AS NEEDED (Patient taking differently: Take 0.5 mg by mouth nightly as needed. )    apixaban (ELIQUIS) 5 mg Tab Take 5 mg by mouth 2 (two) times daily.    aspirin 81 MG Chew Take 81 mg by mouth once daily.    carvedilol (COREG) 12.5 MG tablet TAKE ONE TABLET BY MOUTH TWICE DAILY (Patient taking differently: Take 12.5 mg by mouth 2 (two) times daily. )    cyclobenzaprine (FLEXERIL) 10 MG tablet Take 1 tablet (10 mg total) by mouth every 8 (eight) hours as needed for Muscle spasms.    fluticasone-vilanterol (BREO ELLIPTA) 200-25 mcg/dose DsDv diskus inhaler Inhale 1 puff into the lungs once daily. Controller    furosemide (LASIX) 40 MG tablet Take 1 tablet (40 mg total) by mouth once daily.    hydrALAZINE (APRESOLINE) 50 MG tablet Take 1 tablet (50 mg total) by mouth every 12 (twelve) hours.    HYDROcodone-acetaminophen (NORCO) 7.5-325 mg per tablet Take 1 tablet by mouth every 12 (twelve) hours as needed for Pain.    insulin glargine (LANTUS U-100 INSULIN) 100 unit/mL injection INJECT 63 UNITS UNDER SKIN TWICE DAILY (Patient taking differently: Inject 60 Units into the skin 2 (two) times daily. )    insulin lispro (HUMALOG U-100 INSULIN) 100 unit/mL injection INJECT 30 UNITS INTO THE SKIN THREE TIMES DAILY BEFORE MEALS (Patient taking differently: Inject 30 Units into the skin 3 (three) times daily before meals. )    insulin syringe-needle U-100 1 mL 31 gauge x 5/16 Syrg USE AS DIRECTED 5 times a day    metFORMIN (GLUCOPHAGE) 1000 MG tablet Take 1,000 mg by mouth 2 (two) times daily with meals.    montelukast (SINGULAIR) 10 mg tablet TAKE ONE TABLET BY MOUTH EVERY EVENING (Patient taking differently: Take 10 mg by mouth every evening. )    pantoprazole (PROTONIX) 40 MG tablet TAKE ONE TABLET BY MOUTH EVERY MORNING (Patient taking differently: Take 40 mg by mouth 2 (two) times daily. )    pravastatin  (PRAVACHOL) 40 MG tablet Take 1 tablet (40 mg total) by mouth nightly.       Dx:     Chest wall pain atypical and appears non-cardiac in nature, pain worsens with deep inspiration, movement and coughing, somewhat reproducible with palpation, troponin .16 with normal total CK and CK-MB, EKG with no acute ischemic changes. [facility] July 2016 normal coronaries,  Echocardiogram March 2019 EF 65%, grade II diastolic dysfunction, PA pressure 19 mmHg, mild WA, MR, AR, TR     Chronic cough 2 years, states he has an upcoming evaluation for possible esophagitis     Chronic diastolic heart failure euvolemic on exam, BNP normal    Paroxysmal atrial fibrillation on Eliquis 5 mg bid for stroke prophylaxis    Hypertension BP elevated today, could be pain related     T2DM on insulin     Obesity    JENNIFER    Hypercholesterolemia       Plan: Agree with admit for obs, trend CE and rule out for MI.   Tele overnight   EKG in am      Lien Chi NP   Cardiology   [facility]    I attest that I have personally seen and examined this patient. I have reviewed and discussed the management in detail as outlined above.

## 2019-10-14 NOTE — H&P
[facility] Hospital Medicine  History & Physical    Patient Name: Jena Chao  MRN: [id number]  Admission Date: 10/14/2019  Attending Physician: Nura Randhawa MD   Primary Care Provider: Yomi Smith MD         Patient information was obtained from patient, past medical records and ER records.     Subjective:     Principal Problem:  Chest wall pain / left scapular area pain /elevated troponin       Chief Complaint:   Chief Complaint   Patient presents with    Back Pain        HPI:  Patient is a 59 year old male   who has his PCP /cardiology /pulmonolgy at [facility]. Dr Sanders, Dr Jett, ERICK Strickland . He has a past medical history of  Morbid obesity , Chronic asthma , JENNIFER on cpap , paroxysmal atrial fibrillation, T2DM on insulin, GERD, hypertension,and chronic diastolic HF, and chronic cough who presents to the ER with complaints of a left sided back pain that radiates to his lateral chest wall that began last night.   Pain left scapular region ; 10/10 intensity , radiates to left lower breat region   Worse with deep inspiration   He was seen in the ER several days prior with similar complaints. He states that the pain worsens with movement, coughing and deep inspiration. The pain appears to occur in cycles and worsens in intensity at times. EKG with no acute ischemic changes. Chest x ray normal sized heart, cardiac enzymes with mild troponin elevation .16 and normal total CK and CK-MB. Renal function stable.  His two troponins are mildly elevated . Seen by [facility] in ER . Placed in observation for rule out MI .  EKG   Sinus rhythm with 1st degree A-V block  Vertical axis  Low voltage QRS  Abnormal ECG  When compared with ECG of 23.            Past Medical History:   Diagnosis Date    Acid reflux     Anticoagulant long-term use     Eliquis for a-fib    Anxiety disorder     Atrial fibrillation     Back pain     Coronary artery disease     Diabetes     Diabetes mellitus     Diabetes  mellitus, type 2     HTN (hypertension)     Hypercholesteremia     Hypertension     Migraine     Myocardial infarction     Respiratory distress     chronic cough    Sleep apnea        Past Surgical History:   Procedure Laterality Date    ANKLE SURGERY      ANKLE SURGERY Bilateral     ANTERIOR CERVICAL DISCECTOMY W/ FUSION      carpal tunnel both hands      ESOPHAGOGASTRODUODENOSCOPY N/A 9/5/2019    Procedure: EGD (ESOPHAGOGASTRODUODENOSCOPY);  Surgeon: Ciro Angulo MD;  Location: [facility];  Service: Endoscopy;  Laterality: N/A;    HERNIA REPAIR      NECK SURGERY      SHOULDER SURGERY      SHOULDER SURGERY Bilateral     UVULOPALATOPHARYNGOPLASTY      WRIST SURGERY Bilateral        Review of patient's allergies indicates:   Allergen Reactions    Spironolactone      gynecomatia       No current facility-administered medications on file prior to encounter.      Current Outpatient Medications on File Prior to Encounter   Medication Sig    albuterol (PROVENTIL) 2.5 mg /3 mL (0.083 %) nebulizer solution Take 2.5 mg by nebulization every 6 (six) hours as needed.    albuterol (PROVENTIL/VENTOLIN HFA) 90 mcg/actuation inhaler Inhale 2 puffs into the lungs every 6 (six) hours as needed for Wheezing (COUGH).    ALPRAZolam (XANAX) 0.5 MG tablet TAKE ONE TABLET BY MOUTH EVERY DAY AS NEEDED (Patient taking differently: Take 0.5 mg by mouth nightly as needed. )    apixaban (ELIQUIS) 5 mg Tab Take 5 mg by mouth 2 (two) times daily.    aspirin 81 MG Chew Take 81 mg by mouth once daily.    carvedilol (COREG) 12.5 MG tablet TAKE ONE TABLET BY MOUTH TWICE DAILY (Patient taking differently: Take 12.5 mg by mouth 2 (two) times daily. )    fluticasone-vilanterol (BREO ELLIPTA) 200-25 mcg/dose DsDv diskus inhaler Inhale 1 puff into the lungs once daily. Controller    furosemide (LASIX) 40 MG tablet Take 1 tablet (40 mg total) by mouth once daily.    hydrALAZINE (APRESOLINE) 50 MG tablet Take 1 tablet (50  mg total) by mouth every 12 (twelve) hours.    HYDROcodone-acetaminophen (NORCO) 7.5-325 mg per tablet Take 1 tablet by mouth every 12 (twelve) hours as needed for Pain.    metFORMIN (GLUCOPHAGE) 1000 MG tablet Take 1,000 mg by mouth 2 (two) times daily with meals.    montelukast (SINGULAIR) 10 mg tablet TAKE ONE TABLET BY MOUTH EVERY EVENING (Patient taking differently: Take 10 mg by mouth every evening. )    pantoprazole (PROTONIX) 40 MG tablet TAKE ONE TABLET BY MOUTH EVERY MORNING (Patient taking differently: Take 40 mg by mouth 2 (two) times daily. )    pravastatin (PRAVACHOL) 40 MG tablet Take 1 tablet (40 mg total) by mouth nightly.    cyclobenzaprine (FLEXERIL) 10 MG tablet Take 1 tablet (10 mg total) by mouth every 8 (eight) hours as needed for Muscle spasms.    insulin glargine (LANTUS U-100 INSULIN) 100 unit/mL injection INJECT 63 UNITS UNDER SKIN TWICE DAILY (Patient taking differently: Inject 60 Units into the skin 2 (two) times daily. )    insulin lispro (HUMALOG U-100 INSULIN) 100 unit/mL injection INJECT 30 UNITS INTO THE SKIN THREE TIMES DAILY BEFORE MEALS (Patient taking differently: Inject 30 Units into the skin 3 (three) times daily before meals. )    insulin syringe-needle U-100 1 mL 31 gauge x 5/16 Syrg USE AS DIRECTED 5 times a day     Family History     Problem Relation (Age of Onset)    Cancer Father    No Known Problems Mother        Tobacco Use    Smoking status: Never Smoker    Smokeless tobacco: Never Used   Substance and Sexual Activity    Alcohol use: Yes     Comment: seldom    Drug use: Yes     Types: Hydrocodone    Sexual activity: Not Currently     Review of Systems   Constitutional: Negative for chills and fever.   HENT: Negative for congestion, postnasal drip and sore throat.    Eyes: Negative for photophobia.   Respiratory: Negative for chest tightness and shortness of breath.    Cardiovascular: Positive for chest pain.        Left scapular area pain ;radiates to  front    Gastrointestinal: Negative for abdominal distention, abdominal pain, blood in stool and vomiting.        Some swallowing dysfunction    Genitourinary: Negative for dysuria, flank pain and hematuria.   Musculoskeletal: Negative for back pain.   Skin: Negative for pallor.   Neurological: Negative for dizziness, seizures, facial asymmetry, speech difficulty and numbness.   Hematological: Does not bruise/bleed easily.   Psychiatric/Behavioral: Negative for agitation and suicidal ideas. The patient is not nervous/anxious.      Objective:     Vital Signs (Most Recent):  Temp: 97 °F (36.1 °C) (10/14/19 1744)  Pulse: (!) 58 (10/14/19 1744)  Resp: 20 (10/14/19 1744)  BP: (!) 149/72 (10/14/19 1744)  SpO2: 98 % (10/14/19 1744) Vital Signs (24h Range):  Temp:  [97 °F (36.1 °C)-98.8 °F (37.1 °C)] 97 °F (36.1 °C)  Pulse:  [58-76] 58  Resp:  [16-22] 20  SpO2:  [96 %-98 %] 98 %  BP: (149-162)/(72-91) 149/72     Weight: (!) 190 kg (418 lb 14 oz)  Body mass index is 61.86 kg/m².    Physical Exam   Constitutional: He is oriented to person, place, and time.   Morbidly obese male with  pain    HENT:   Head: Normocephalic and atraumatic.   Nose: Nose normal.   Mouth/Throat: Oropharynx is clear and moist.   Eyes: Pupils are equal, round, and reactive to light. Conjunctivae and EOM are normal.   Neck: Normal range of motion. Neck supple. No JVD present. No thyromegaly present.   Cardiovascular: Normal rate, regular rhythm, normal heart sounds and intact distal pulses.   Pulmonary/Chest: Effort normal and breath sounds normal.           Pain area ; radiation to front ; no rash ; no redness   No tenderness upon palpation    Abdominal: Soft. Bowel sounds are normal. He exhibits no distension and no mass. There is no tenderness. There is no guarding.   Musculoskeletal: Normal range of motion. He exhibits no edema.   Lymphadenopathy:     He has no cervical adenopathy.   Neurological: He is alert and oriented to person, place, and time.  He has normal reflexes. No cranial nerve deficit.   Skin: Skin is warm and dry. No rash noted. No pallor.   Psychiatric: He has a normal mood and affect.   Nursing note and vitals reviewed.        CRANIAL NERVES     CN III, IV, VI   Pupils are equal, round, and reactive to light.  Extraocular motions are normal.        Significant Labs:   A1C:   Recent Labs   Lab 04/23/19  1811 08/06/19  1552 09/03/19  1725   HGBA1C 9.6* 8.7* 10.2*     CBC:   Recent Labs   Lab 10/14/19  1252   WBC 7.85   HGB 11.5*   HCT 37.3*        CMP:   Recent Labs   Lab 10/14/19  1252      K 3.8      CO2 29   *   BUN 17   CREATININE 0.9   CALCIUM 8.7   PROT 6.6   ALBUMIN 3.4*   BILITOT 0.5   ALKPHOS 61   AST 14   ALT 18   ANIONGAP 9   EGFRNONAA >60     Lab Results   Component Value Date    DDIMER 0.32 10/14/2019         Troponin:   Recent Labs   Lab 10/14/19  1252 10/14/19  1549   TROPONINI 0.161* 0.186*     BNP  Recent Labs   Lab 10/14/19  1252   BNP 49     Significant Imaging: CXR: I have reviewed all pertinent results/findings within the past 24 hours and my personal findings are:  The heart is mildly enlarged.  There is central pulmonary vascular congestion and suspected mild edema.  No consolidation or pleural effusion.  Skeletal structures are intact.  Cervical fusion hardware is seen.    Assessment/Plan:     Chest wall pain  No rash ;   No hyperemia   Chest pain looks like muscular   Resume norco/ flexaril         Morbid obesity    # The patient is asked to make an attempt to improve diet and exercise patterns to aid in medical management of this problem.     # Eat  5 small meals a day.     # Cut out high carbohydrate  foods : bread, rice, pasta, potatoes.     # Exercise/walk 5x/week for at least 30-45  minutes.            Essential (primary) hypertension    Resume coreg, lasix, hydralazine    Diastolic dysfunction with acute on chronic heart failure  BNP normal   Resume lasix         Type 2 diabetes mellitus  with diabetic polyneuropathy, with long-term current use of insulin  accu check s  Correction scale  Hold metformin       Obstructive sleep apnea  He uses cpap at home   I asked him if someone can bring his CPAP .      Dyslipidemia  Resume statin       Chronic atrial fibrillation  Sinus rhythm with 1 degree   On eliquis and coreg       Coronary artery disease    Resume asa, plavix       VTE Risk Mitigation (From admission, onward)         Ordered     apixaban tablet 5 mg  2 times daily      10/14/19 1807     IP VTE HIGH RISK PATIENT  Once      10/14/19 1744     Place sequential compression device  Until discontinued      10/14/19 1744                   Nura Randhawa MD  Department of Hospital Medicine   [facility]

## 2019-10-14 NOTE — ED PROVIDER NOTES
[facility]                                                 Chief Complaint  59 y.o. male with Back Pain    History of Present Illness  Jean Chao presents to the emergency room with left chest wall pain  Patient with significant left chest wall pain for last couple days, worse now  Patient was seen at [place] emergency room on October 9, 2019  Had a negative workup at that time, continue to have pain after discharge  Pt has no nausea vomiting and no abdominal pain, afebrile VSS on triage    The history is provided by the patient   device was not used during this ER visit    Past Medical History   -- Acid reflux    -- Anticoagulant long-term use    -- Anxiety disorder    -- Atrial fibrillation    -- Back pain    -- Coronary artery disease    -- Diabetes    -- Diabetes mellitus    -- Diabetes mellitus, type 2    -- HTN (hypertension)    -- Hypercholesteremia    -- Hypertension    -- Migraine    -- Myocardial infarction    -- Respiratory distress    -- Sleep apnea      Surgeries: Ankle, cervical, EGD, hernia, shoulder, neck, wrist, uvulopalatopharyngoplasty  Allergies: Spironolactone    I have reviewed all of this patient's past medical, surgical, family, and social   histories as well as active allergies and medications documented in the  electronic medical record    Review of Systems and Physical Exam      Review of Systems  -- Constitution - no fever, denies fatigue, no weakness, no chills  -- Eyes - no tearing or redness, no visual disturbance  -- Ear, Nose - no tinnitus or earache, no nasal congestion or discharge  -- Mouth,Throat - no sore throat, no toothache, normal voice, normal swallowing  -- Respiratory - denies cough and congestion, no shortness of breath, no VICK  -- Cardiovascular - denies chest pain, no palpitations, denies claudication  -- Gastrointestinal - denies abdominal pain, nausea, vomiting, or diarrhea  -- Genitourinary - no dysuria, denies flank  pain, no hematuria, no STD risk  -- Musculoskeletal - left chest wall pain  -- Neurological - no headache, denies weakness or seizure; no LOC  -- Skin - denies pallor, rash, or changes in skin. no hives or welts noted  -- Psychiatric - Denies SI or HI, no psychosis or fractured thought noted     Vital Signs  His oral temperature is 97 °F (36.1 °C).   His blood pressure is 149/72 and his pulse is 72.   His respiration is 20 and oxygen saturation is 98%.     Physical Exam  -- Nursing note and vitals reviewed  -- Constitutional: Appears well-developed and well-nourished  -- Head: Atraumatic. Normocephalic. No obvious abnormality  -- Eyes: Pupils are equal and reactive to light. Normal conjunctiva and lids  -- Cardiac: Normal rate, regular rhythm and normal heart sounds  -- Pulmonary: Normal respiratory effort, breath sounds clear to auscultation  -- Abdominal: Soft, no tenderness. Normal bowel sounds. Normal liver edge  -- Musculoskeletal: Normal range of motion, no effusions. Joints stable   -- Neurological: No focal deficits. Showed good interaction with staff  -- Vascular: Posterior tibial, dorsalis pedis and radial pulses 2+ bilaterally      Emergency Room Course      Lab Results     K 3.8      CO2 29   BUN 17   CREATININE 0.9    (H)   ALKPHOS 61   AST 14   ALT 18   BILITOT 0.5   ALBUMIN 3.4 (L)   PROT 6.6   WBC 7.85   HGB 11.5 (L)   HCT 37.3 (L)      CPK 62   CPK 62   CPKMB 1.5   TROPONINI 0.186 (H)   INR 1.0   BNP 49   DDIMER 0.32   MG 2.6     EKG  -- The EKG findings today were without concerning findings from baseline  -- troponin is 0.161, 2nd troponin 0.181    Radiology  -- Chest x-ray showed no infiltrate and showed no acute pathology     Medications Given  aspirin tablet 325 mg (325 mg Oral Given 10/14/19 1242)   albuterol-ipratropium 2.5 mg-0.5 mg/3 mL nebulizer solution 3 mL (3 mLs Nebulization Given 10/14/19 1317)   ketorolac injection 30 mg (30 mg Intramuscular Given 10/14/19  1340)   HYDROmorphone injection 2 mg (2 mg Intramuscular Given 10/14/19 1501)   ondansetron injection 4 mg (4 mg Intravenous Given 10/14/19 1500)   nitroGLYCERIN 2% TD oint ointment 1 inch (1 inch Topical (Top) Given 10/14/19 1648)   pantoprazole EC tablet 40 mg (40 mg Oral Given 10/14/19 1648)   atorvastatin tablet 40 mg (40 mg Oral Given 10/14/19 1648)     ED Physician Management  -- Diagnosis management comments: 59 y.o. male with left chest wall pain  -- patient had an evaluation including chest x-ray EKG and a negative D-dimer  -- patient had a mildly elevated troponin of 0.161, 2nd troponin 0.181  -- [facility] cardiology consult recommends observation overnight for evaluation  -- additionally, have consulted pulmonology, chronic shortness of breath    Diagnosis  -- Chest wall pain  -- SOB (shortness of breath)  -- Elevated troponin    Disposition and Plan  -- Disposition: observation  -- Condition: stable    This note is dictated on M*Modal word recognition program.  There are word recognition mistakes that are occasionally missed on review.         Jonah Esposito MD  10/14/19 9016

## 2019-10-14 NOTE — NURSING
Patient transported to third floor room 305.  NADN.  Care assumed after report received from Nadiya MCKEON.  Patient in no distress.  AAOx3 with no complaints of chest pain but pain in left upper back.  Patient oriented to room, bed in low position, side rails up x 2, call bell in reach.

## 2019-10-14 NOTE — SUBJECTIVE & OBJECTIVE
Past Medical History:   Diagnosis Date    Acid reflux     Anticoagulant long-term use     Eliquis for a-fib    Anxiety disorder     Atrial fibrillation     Back pain     Coronary artery disease     Diabetes     Diabetes mellitus     Diabetes mellitus, type 2     HTN (hypertension)     Hypercholesteremia     Hypertension     Migraine     Myocardial infarction     Respiratory distress     chronic cough    Sleep apnea        Past Surgical History:   Procedure Laterality Date    ANKLE SURGERY      ANKLE SURGERY Bilateral     ANTERIOR CERVICAL DISCECTOMY W/ FUSION      carpal tunnel both hands      ESOPHAGOGASTRODUODENOSCOPY N/A 9/5/2019    Procedure: EGD (ESOPHAGOGASTRODUODENOSCOPY);  Surgeon: Ciro Angulo MD;  Location: [facility];  Service: Endoscopy;  Laterality: N/A;    HERNIA REPAIR      NECK SURGERY      SHOULDER SURGERY      SHOULDER SURGERY Bilateral     UVULOPALATOPHARYNGOPLASTY      WRIST SURGERY Bilateral        Review of patient's allergies indicates:   Allergen Reactions    Spironolactone      gynecomatia       No current facility-administered medications on file prior to encounter.      Current Outpatient Medications on File Prior to Encounter   Medication Sig    albuterol (PROVENTIL) 2.5 mg /3 mL (0.083 %) nebulizer solution Take 2.5 mg by nebulization every 6 (six) hours as needed.    albuterol (PROVENTIL/VENTOLIN HFA) 90 mcg/actuation inhaler Inhale 2 puffs into the lungs every 6 (six) hours as needed for Wheezing (COUGH).    ALPRAZolam (XANAX) 0.5 MG tablet TAKE ONE TABLET BY MOUTH EVERY DAY AS NEEDED (Patient taking differently: Take 0.5 mg by mouth nightly as needed. )    apixaban (ELIQUIS) 5 mg Tab Take 5 mg by mouth 2 (two) times daily.    aspirin 81 MG Chew Take 81 mg by mouth once daily.    carvedilol (COREG) 12.5 MG tablet TAKE ONE TABLET BY MOUTH TWICE DAILY (Patient taking differently: Take 12.5 mg by mouth 2 (two) times daily. )     fluticasone-vilanterol (BREO ELLIPTA) 200-25 mcg/dose DsDv diskus inhaler Inhale 1 puff into the lungs once daily. Controller    furosemide (LASIX) 40 MG tablet Take 1 tablet (40 mg total) by mouth once daily.    hydrALAZINE (APRESOLINE) 50 MG tablet Take 1 tablet (50 mg total) by mouth every 12 (twelve) hours.    HYDROcodone-acetaminophen (NORCO) 7.5-325 mg per tablet Take 1 tablet by mouth every 12 (twelve) hours as needed for Pain.    metFORMIN (GLUCOPHAGE) 1000 MG tablet Take 1,000 mg by mouth 2 (two) times daily with meals.    montelukast (SINGULAIR) 10 mg tablet TAKE ONE TABLET BY MOUTH EVERY EVENING (Patient taking differently: Take 10 mg by mouth every evening. )    pantoprazole (PROTONIX) 40 MG tablet TAKE ONE TABLET BY MOUTH EVERY MORNING (Patient taking differently: Take 40 mg by mouth 2 (two) times daily. )    pravastatin (PRAVACHOL) 40 MG tablet Take 1 tablet (40 mg total) by mouth nightly.    cyclobenzaprine (FLEXERIL) 10 MG tablet Take 1 tablet (10 mg total) by mouth every 8 (eight) hours as needed for Muscle spasms.    insulin glargine (LANTUS U-100 INSULIN) 100 unit/mL injection INJECT 63 UNITS UNDER SKIN TWICE DAILY (Patient taking differently: Inject 60 Units into the skin 2 (two) times daily. )    insulin lispro (HUMALOG U-100 INSULIN) 100 unit/mL injection INJECT 30 UNITS INTO THE SKIN THREE TIMES DAILY BEFORE MEALS (Patient taking differently: Inject 30 Units into the skin 3 (three) times daily before meals. )    insulin syringe-needle U-100 1 mL 31 gauge x 5/16 Syrg USE AS DIRECTED 5 times a day     Family History     Problem Relation (Age of Onset)    Cancer Father    No Known Problems Mother        Tobacco Use    Smoking status: Never Smoker    Smokeless tobacco: Never Used   Substance and Sexual Activity    Alcohol use: Yes     Comment: seldom    Drug use: Yes     Types: Hydrocodone    Sexual activity: Not Currently     Review of Systems   Constitutional: Negative for chills  and fever.   HENT: Negative for congestion, postnasal drip and sore throat.    Eyes: Negative for photophobia.   Respiratory: Negative for chest tightness and shortness of breath.    Cardiovascular: Positive for chest pain.        Left scapular area pain ;radiates to front    Gastrointestinal: Negative for abdominal distention, abdominal pain, blood in stool and vomiting.        Some swallowing dysfunction    Genitourinary: Negative for dysuria, flank pain and hematuria.   Musculoskeletal: Negative for back pain.   Skin: Negative for pallor.   Neurological: Negative for dizziness, seizures, facial asymmetry, speech difficulty and numbness.   Hematological: Does not bruise/bleed easily.   Psychiatric/Behavioral: Negative for agitation and suicidal ideas. The patient is not nervous/anxious.      Objective:     Vital Signs (Most Recent):  Temp: 97 °F (36.1 °C) (10/14/19 1744)  Pulse: (!) 58 (10/14/19 1744)  Resp: 20 (10/14/19 1744)  BP: (!) 149/72 (10/14/19 1744)  SpO2: 98 % (10/14/19 1744) Vital Signs (24h Range):  Temp:  [97 °F (36.1 °C)-98.8 °F (37.1 °C)] 97 °F (36.1 °C)  Pulse:  [58-76] 58  Resp:  [16-22] 20  SpO2:  [96 %-98 %] 98 %  BP: (149-162)/(72-91) 149/72     Weight: (!) 190 kg (418 lb 14 oz)  Body mass index is 61.86 kg/m².    Physical Exam   Constitutional: He is oriented to person, place, and time.   Morbidly obese male with  pain    HENT:   Head: Normocephalic and atraumatic.   Nose: Nose normal.   Mouth/Throat: Oropharynx is clear and moist.   Eyes: Pupils are equal, round, and reactive to light. Conjunctivae and EOM are normal.   Neck: Normal range of motion. Neck supple. No JVD present. No thyromegaly present.   Cardiovascular: Normal rate, regular rhythm, normal heart sounds and intact distal pulses.   Pulmonary/Chest: Effort normal and breath sounds normal.           Pain area ; radiation to front ; no rash ; no redness   No tenderness upon palpation    Abdominal: Soft. Bowel sounds are normal. He  exhibits no distension and no mass. There is no tenderness. There is no guarding.   Musculoskeletal: Normal range of motion. He exhibits no edema.   Lymphadenopathy:     He has no cervical adenopathy.   Neurological: He is alert and oriented to person, place, and time. He has normal reflexes. No cranial nerve deficit.   Skin: Skin is warm and dry. No rash noted. No pallor.   Psychiatric: He has a normal mood and affect.   Nursing note and vitals reviewed.        CRANIAL NERVES     CN III, IV, VI   Pupils are equal, round, and reactive to light.  Extraocular motions are normal.        Significant Labs:   A1C:   Recent Labs   Lab 04/23/19  1811 08/06/19  1552 09/03/19  1725   HGBA1C 9.6* 8.7* 10.2*     CBC:   Recent Labs   Lab 10/14/19  1252   WBC 7.85   HGB 11.5*   HCT 37.3*        CMP:   Recent Labs   Lab 10/14/19  1252      K 3.8      CO2 29   *   BUN 17   CREATININE 0.9   CALCIUM 8.7   PROT 6.6   ALBUMIN 3.4*   BILITOT 0.5   ALKPHOS 61   AST 14   ALT 18   ANIONGAP 9   EGFRNONAA >60     Lab Results   Component Value Date    DDIMER 0.32 10/14/2019         Troponin:   Recent Labs   Lab 10/14/19  1252 10/14/19  1549   TROPONINI 0.161* 0.186*     BNP  Recent Labs   Lab 10/14/19  1252   BNP 49     Significant Imaging: CXR: I have reviewed all pertinent results/findings within the past 24 hours and my personal findings are:  The heart is mildly enlarged.  There is central pulmonary vascular congestion and suspected mild edema.  No consolidation or pleural effusion.  Skeletal structures are intact.  Cervical fusion hardware is seen.

## 2019-10-14 NOTE — HPI
Patient is a 59 year old male   who has his PCP /cardiology /pulmonolgy at [facility] [place]. Dr Sanders, Dr Jett, ERICK Strickland . He has a past medical history of  Morbid obesity , Chronic asthma , JENNIFER on cpap , paroxysmal atrial fibrillation, T2DM on insulin, GERD, hypertension,and chronic diastolic HF, and chronic cough who presents to the ER with complaints of a left sided back pain that radiates to his lateral chest wall that began last night.   Pain left scapular region ; 10/10 intensity , radiates to left lower breat region   Worse with deep inspiration   He was seen in the ER several days prior with similar complaints. He states that the pain worsens with movement, coughing and deep inspiration. The pain appears to occur in cycles and worsens in intensity at times. EKG with no acute ischemic changes. Chest x ray normal sized heart, cardiac enzymes with mild troponin elevation .16 and normal total CK and CK-MB. Renal function stable.  His two troponins are mildly elevated . Seen by [facility] in ER . Placed in observation for rule out MI .  EKG   Sinus rhythm with 1st degree A-V block  Vertical axis  Low voltage QRS  Abnormal ECG  When compared with ECG of 23.

## 2019-10-15 VITALS
TEMPERATURE: 97 F | BODY MASS INDEX: 46.65 KG/M2 | RESPIRATION RATE: 19 BRPM | SYSTOLIC BLOOD PRESSURE: 138 MMHG | WEIGHT: 315 LBS | HEART RATE: 57 BPM | OXYGEN SATURATION: 98 % | HEIGHT: 69 IN | DIASTOLIC BLOOD PRESSURE: 61 MMHG

## 2019-10-15 PROBLEM — J45.40 MODERATE PERSISTENT ASTHMA WITHOUT COMPLICATION: Chronic | Status: ACTIVE | Noted: 2019-04-23

## 2019-10-15 PROBLEM — D64.9 NORMOCYTIC ANEMIA: Chronic | Status: ACTIVE | Noted: 2018-11-14

## 2019-10-15 PROBLEM — R05.9 COUGH: Status: RESOLVED | Noted: 2018-04-18 | Resolved: 2019-10-15

## 2019-10-15 PROBLEM — R07.89 CHEST WALL PAIN: Status: RESOLVED | Noted: 2019-10-14 | Resolved: 2019-10-15

## 2019-10-15 PROBLEM — J96.10 CHRONIC RESPIRATORY FAILURE: Status: ACTIVE | Noted: 2019-10-15

## 2019-10-15 PROBLEM — J44.1 COPD WITH ACUTE EXACERBATION: Status: ACTIVE | Noted: 2019-10-15

## 2019-10-15 PROBLEM — I48.20 CHRONIC ATRIAL FIBRILLATION: Chronic | Status: ACTIVE | Noted: 2017-04-10

## 2019-10-15 LAB
ALBUMIN SERPL BCP-MCNC: 3.1 G/DL (ref 3.5–5.2)
ALP SERPL-CCNC: 59 U/L (ref 55–135)
ALT SERPL W/O P-5'-P-CCNC: 16 U/L (ref 10–44)
ANION GAP SERPL CALC-SCNC: 10 MMOL/L (ref 8–16)
AST SERPL-CCNC: 11 U/L (ref 10–40)
BASOPHILS # BLD AUTO: 0.04 K/UL (ref 0–0.2)
BASOPHILS NFR BLD: 0.5 % (ref 0–1.9)
BILIRUB SERPL-MCNC: 0.4 MG/DL (ref 0.1–1)
BUN SERPL-MCNC: 18 MG/DL (ref 6–20)
CALCIUM SERPL-MCNC: 8.6 MG/DL (ref 8.7–10.5)
CHLORIDE SERPL-SCNC: 103 MMOL/L (ref 95–110)
CO2 SERPL-SCNC: 29 MMOL/L (ref 23–29)
CREAT SERPL-MCNC: 1 MG/DL (ref 0.5–1.4)
DIFFERENTIAL METHOD: ABNORMAL
EOSINOPHIL # BLD AUTO: 0.1 K/UL (ref 0–0.5)
EOSINOPHIL NFR BLD: 1 % (ref 0–8)
ERYTHROCYTE [DISTWIDTH] IN BLOOD BY AUTOMATED COUNT: 14.7 % (ref 11.5–14.5)
EST. GFR  (AFRICAN AMERICAN): >60 ML/MIN/1.73 M^2
EST. GFR  (NON AFRICAN AMERICAN): >60 ML/MIN/1.73 M^2
GLUCOSE SERPL-MCNC: 198 MG/DL (ref 70–110)
HCT VFR BLD AUTO: 34.9 % (ref 40–54)
HGB BLD-MCNC: 10.5 G/DL (ref 14–18)
IMM GRANULOCYTES # BLD AUTO: 0.04 K/UL (ref 0–0.04)
IMM GRANULOCYTES NFR BLD AUTO: 0.5 % (ref 0–0.5)
LYMPHOCYTES # BLD AUTO: 1.8 K/UL (ref 1–4.8)
LYMPHOCYTES NFR BLD: 21.4 % (ref 18–48)
MCH RBC QN AUTO: 25.9 PG (ref 27–31)
MCHC RBC AUTO-ENTMCNC: 30.1 G/DL (ref 32–36)
MCV RBC AUTO: 86 FL (ref 82–98)
MONOCYTES # BLD AUTO: 0.6 K/UL (ref 0.3–1)
MONOCYTES NFR BLD: 7.7 % (ref 4–15)
NEUTROPHILS # BLD AUTO: 5.6 K/UL (ref 1.8–7.7)
NEUTROPHILS NFR BLD: 68.9 % (ref 38–73)
NRBC BLD-RTO: 0 /100 WBC
PLATELET # BLD AUTO: 214 K/UL (ref 150–350)
PMV BLD AUTO: 11.1 FL (ref 9.2–12.9)
POCT GLUCOSE: 171 MG/DL (ref 70–110)
POCT GLUCOSE: 250 MG/DL (ref 70–110)
POTASSIUM SERPL-SCNC: 4.2 MMOL/L (ref 3.5–5.1)
PROT SERPL-MCNC: 6.1 G/DL (ref 6–8.4)
RBC # BLD AUTO: 4.06 M/UL (ref 4.6–6.2)
SODIUM SERPL-SCNC: 142 MMOL/L (ref 136–145)
TROPONIN I SERPL DL<=0.01 NG/ML-MCNC: 0.09 NG/ML (ref 0–0.03)
TROPONIN I SERPL DL<=0.01 NG/ML-MCNC: 0.1 NG/ML (ref 0–0.03)
TROPONIN I SERPL DL<=0.01 NG/ML-MCNC: 0.12 NG/ML (ref 0–0.03)
WBC # BLD AUTO: 8.16 K/UL (ref 3.9–12.7)

## 2019-10-15 PROCEDURE — 94640 AIRWAY INHALATION TREATMENT: CPT

## 2019-10-15 PROCEDURE — 25000003 PHARM REV CODE 250: Performed by: INTERNAL MEDICINE

## 2019-10-15 PROCEDURE — G0378 HOSPITAL OBSERVATION PER HR: HCPCS

## 2019-10-15 PROCEDURE — 99217 PR OBSERVATION CARE DISCHARGE: ICD-10-PCS | Mod: ,,, | Performed by: FAMILY MEDICINE

## 2019-10-15 PROCEDURE — 27000646 HC AEROBIKA DEVICE

## 2019-10-15 PROCEDURE — 80053 COMPREHEN METABOLIC PANEL: CPT

## 2019-10-15 PROCEDURE — 25000003 PHARM REV CODE 250: Performed by: SURGERY

## 2019-10-15 PROCEDURE — 27000221 HC OXYGEN, UP TO 24 HOURS

## 2019-10-15 PROCEDURE — 63600175 PHARM REV CODE 636 W HCPCS: Performed by: INTERNAL MEDICINE

## 2019-10-15 PROCEDURE — 25000242 PHARM REV CODE 250 ALT 637 W/ HCPCS: Performed by: INTERNAL MEDICINE

## 2019-10-15 PROCEDURE — 99900035 HC TECH TIME PER 15 MIN (STAT)

## 2019-10-15 PROCEDURE — 84484 ASSAY OF TROPONIN QUANT: CPT

## 2019-10-15 PROCEDURE — 85025 COMPLETE CBC W/AUTO DIFF WBC: CPT

## 2019-10-15 PROCEDURE — 93005 ELECTROCARDIOGRAM TRACING: CPT

## 2019-10-15 PROCEDURE — 94664 DEMO&/EVAL PT USE INHALER: CPT | Mod: 59

## 2019-10-15 PROCEDURE — 94660 CPAP INITIATION&MGMT: CPT

## 2019-10-15 PROCEDURE — 94799 UNLISTED PULMONARY SVC/PX: CPT

## 2019-10-15 PROCEDURE — 96372 THER/PROPH/DIAG INJ SC/IM: CPT

## 2019-10-15 PROCEDURE — 94760 N-INVAS EAR/PLS OXIMETRY 1: CPT

## 2019-10-15 PROCEDURE — 27000190 HC CPAP FULL FACE MASK W/VALVE

## 2019-10-15 PROCEDURE — 99217 PR OBSERVATION CARE DISCHARGE: CPT | Mod: ,,, | Performed by: FAMILY MEDICINE

## 2019-10-15 PROCEDURE — 36415 COLL VENOUS BLD VENIPUNCTURE: CPT

## 2019-10-15 RX ADMIN — HYDROCODONE BITARTRATE AND ACETAMINOPHEN 1 TABLET: 7.5; 325 TABLET ORAL at 02:10

## 2019-10-15 RX ADMIN — FUROSEMIDE 40 MG: 40 TABLET ORAL at 08:10

## 2019-10-15 RX ADMIN — ALPRAZOLAM 0.5 MG: 0.5 TABLET ORAL at 02:10

## 2019-10-15 RX ADMIN — CARVEDILOL 12.5 MG: 12.5 TABLET, FILM COATED ORAL at 08:10

## 2019-10-15 RX ADMIN — HYDRALAZINE HYDROCHLORIDE 50 MG: 50 TABLET ORAL at 08:10

## 2019-10-15 RX ADMIN — FLUTICASONE FUROATE AND VILANTEROL TRIFENATATE 1 PUFF: 200; 25 POWDER RESPIRATORY (INHALATION) at 09:10

## 2019-10-15 RX ADMIN — IPRATROPIUM BROMIDE AND ALBUTEROL SULFATE 3 ML: .5; 3 SOLUTION RESPIRATORY (INHALATION) at 12:10

## 2019-10-15 RX ADMIN — INSULIN ASPART 10 UNITS: 100 INJECTION, SOLUTION INTRAVENOUS; SUBCUTANEOUS at 08:10

## 2019-10-15 RX ADMIN — INSULIN ASPART 10 UNITS: 100 INJECTION, SOLUTION INTRAVENOUS; SUBCUTANEOUS at 11:10

## 2019-10-15 RX ADMIN — IPRATROPIUM BROMIDE AND ALBUTEROL SULFATE 3 ML: .5; 3 SOLUTION RESPIRATORY (INHALATION) at 11:10

## 2019-10-15 RX ADMIN — HYDROCODONE BITARTRATE AND ACETAMINOPHEN 1 TABLET: 7.5; 325 TABLET ORAL at 11:10

## 2019-10-15 RX ADMIN — INDOMETHACIN 50 MG: 25 CAPSULE ORAL at 08:10

## 2019-10-15 RX ADMIN — APIXABAN 5 MG: 5 TABLET, FILM COATED ORAL at 08:10

## 2019-10-15 RX ADMIN — INDOMETHACIN 50 MG: 25 CAPSULE ORAL at 11:10

## 2019-10-15 RX ADMIN — PANTOPRAZOLE SODIUM 40 MG: 40 TABLET, DELAYED RELEASE ORAL at 08:10

## 2019-10-15 RX ADMIN — IPRATROPIUM BROMIDE AND ALBUTEROL SULFATE 3 ML: .5; 3 SOLUTION RESPIRATORY (INHALATION) at 07:10

## 2019-10-15 RX ADMIN — ASPIRIN 81 MG 81 MG: 81 TABLET ORAL at 08:10

## 2019-10-15 RX ADMIN — IPRATROPIUM BROMIDE AND ALBUTEROL SULFATE 3 ML: .5; 3 SOLUTION RESPIRATORY (INHALATION) at 04:10

## 2019-10-15 NOTE — PROGRESS NOTES
[facility] Medicine  Progress Note    Patient Name: Jean Chao  MRN: [id number]  Patient Class: OP- Observation   Admission Date: 10/14/2019  Length of Stay: 0 days  Attending Physician: Nura Randhawa MD  Primary Care Provider: Yomi Smith MD        Subjective:     Principal Problem:Chest wall pain        HPI:   Patient is a 59 year old male   who has his PCP /cardiology /pulmonolgy at [facility] [place] [facility]. Dr Sanders, Dr Jett, ERICK Strickland . He has a past medical history of  Morbid obesity , Chronic asthma , JENNIFER on cpap , paroxysmal atrial fibrillation, T2DM on insulin, GERD, hypertension,and chronic diastolic HF, and chronic cough who presents to the ER with complaints of a left sided back pain that radiates to his lateral chest wall that began last night.   Pain left scapular region ; 10/10 intensity , radiates to left lower breat region   Worse with deep inspiration   He was seen in the ER several days prior with similar complaints. He states that the pain worsens with movement, coughing and deep inspiration. The pain appears to occur in cycles and worsens in intensity at times. EKG with no acute ischemic changes. Chest x ray normal sized heart, cardiac enzymes with mild troponin elevation .16 and normal total CK and CK-MB. Renal function stable.  His two troponins are mildly elevated . Seen by [facility] in ER . Placed in observation for rule out MI .  EKG   Sinus rhythm with 1st degree A-V block  Vertical axis  Low voltage QRS  Abnormal ECG  When compared with ECG of 23.            Overview/Hospital Course:  Pt was placed in observation to R/O MI. VSS/afebrile. Troponin minimally elevated but declining. Dr Fuchs has cleared him. No chest pain. Still c/o back pain that has been persistent for days. No rash noted. Did have some desats with sleeping in ER. Has CPAP at home and Dr Neumann has seen him here. Ordering trilogy for home use. POX 77-86% on RA resting    Interval History:   Past Medical  History:   Diagnosis Date    Acid reflux     Anticoagulant long-term use     Eliquis for a-fib    Anxiety disorder     Atrial fibrillation     Back pain     Coronary artery disease     Diabetes     Diabetes mellitus     Diabetes mellitus, type 2     HTN (hypertension)     Hypercholesteremia     Hypertension     Migraine     Myocardial infarction     Respiratory distress     chronic cough    Sleep apnea        Past Surgical History:   Procedure Laterality Date    ANKLE SURGERY      ANKLE SURGERY Bilateral     ANTERIOR CERVICAL DISCECTOMY W/ FUSION      carpal tunnel both hands      ESOPHAGOGASTRODUODENOSCOPY N/A 9/5/2019    Procedure: EGD (ESOPHAGOGASTRODUODENOSCOPY);  Surgeon: Ciro Angulo MD;  Location[facility];  Service: Endoscopy;  Laterality: N/A;    HERNIA REPAIR      NECK SURGERY      SHOULDER SURGERY      SHOULDER SURGERY Bilateral     UVULOPALATOPHARYNGOPLASTY      WRIST SURGERY Bilateral        Review of patient's allergies indicates:   Allergen Reactions    Spironolactone      gynecomatia       No current facility-administered medications on file prior to encounter.      Current Outpatient Medications on File Prior to Encounter   Medication Sig    albuterol (PROVENTIL) 2.5 mg /3 mL (0.083 %) nebulizer solution Take 2.5 mg by nebulization every 6 (six) hours as needed.    albuterol (PROVENTIL/VENTOLIN HFA) 90 mcg/actuation inhaler Inhale 2 puffs into the lungs every 6 (six) hours as needed for Wheezing (COUGH).    ALPRAZolam (XANAX) 0.5 MG tablet TAKE ONE TABLET BY MOUTH EVERY DAY AS NEEDED (Patient taking differently: Take 0.5 mg by mouth nightly as needed. )    apixaban (ELIQUIS) 5 mg Tab Take 5 mg by mouth 2 (two) times daily.    aspirin 81 MG Chew Take 81 mg by mouth once daily.    carvedilol (COREG) 12.5 MG tablet TAKE ONE TABLET BY MOUTH TWICE DAILY (Patient taking differently: Take 12.5 mg by mouth 2 (two) times daily. )    fluticasone-vilanterol (BREO  ELLIPTA) 200-25 mcg/dose DsDv diskus inhaler Inhale 1 puff into the lungs once daily. Controller    furosemide (LASIX) 40 MG tablet Take 1 tablet (40 mg total) by mouth once daily.    hydrALAZINE (APRESOLINE) 50 MG tablet Take 1 tablet (50 mg total) by mouth every 12 (twelve) hours.    HYDROcodone-acetaminophen (NORCO) 7.5-325 mg per tablet Take 1 tablet by mouth every 12 (twelve) hours as needed for Pain.    metFORMIN (GLUCOPHAGE) 1000 MG tablet Take 1,000 mg by mouth 2 (two) times daily with meals.    montelukast (SINGULAIR) 10 mg tablet TAKE ONE TABLET BY MOUTH EVERY EVENING (Patient taking differently: Take 10 mg by mouth every evening. )    pantoprazole (PROTONIX) 40 MG tablet TAKE ONE TABLET BY MOUTH EVERY MORNING (Patient taking differently: Take 40 mg by mouth 2 (two) times daily. )    pravastatin (PRAVACHOL) 40 MG tablet Take 1 tablet (40 mg total) by mouth nightly.    insulin glargine (LANTUS U-100 INSULIN) 100 unit/mL injection INJECT 63 UNITS UNDER SKIN TWICE DAILY (Patient taking differently: Inject 60 Units into the skin 2 (two) times daily. )    insulin lispro (HUMALOG U-100 INSULIN) 100 unit/mL injection INJECT 30 UNITS INTO THE SKIN THREE TIMES DAILY BEFORE MEALS (Patient taking differently: Inject 30 Units into the skin 3 (three) times daily before meals. )    insulin syringe-needle U-100 1 mL 31 gauge x 5/16 Syrg USE AS DIRECTED 5 times a day    [DISCONTINUED] cyclobenzaprine (FLEXERIL) 10 MG tablet Take 1 tablet (10 mg total) by mouth every 8 (eight) hours as needed for Muscle spasms.     Family History     Problem Relation (Age of Onset)    Cancer Father    No Known Problems Mother        Tobacco Use    Smoking status: Never Smoker    Smokeless tobacco: Never Used   Substance and Sexual Activity    Alcohol use: Yes     Comment: seldom    Drug use: Yes     Types: Hydrocodone    Sexual activity: Not Currently     Review of Systems   Constitutional: Negative for chills and fever.    HENT: Negative for congestion, postnasal drip and sore throat.    Eyes: Negative for photophobia.   Respiratory: Negative for chest tightness and shortness of breath.    Cardiovascular: Positive for chest pain.        Left scapular area pain ;radiates to front    Gastrointestinal: Negative for abdominal distention, abdominal pain, blood in stool and vomiting.        Some swallowing dysfunction    Genitourinary: Negative for dysuria, flank pain and hematuria.   Musculoskeletal: Negative for back pain.   Skin: Negative for pallor.   Neurological: Negative for dizziness, seizures, facial asymmetry, speech difficulty and numbness.   Hematological: Does not bruise/bleed easily.   Psychiatric/Behavioral: Negative for agitation and suicidal ideas. The patient is not nervous/anxious.      Objective:     Vital Signs (Most Recent):  Temp: 97.3 °F (36.3 °C) (10/15/19 1115)  Pulse: 60 (10/15/19 1115)  Resp: 19 (10/15/19 1115)  BP: 138/61 (10/15/19 1115)  SpO2: 98 % (10/15/19 1115) Vital Signs (24h Range):  Temp:  [96.1 °F (35.6 °C)-98.8 °F (37.1 °C)] 97.3 °F (36.3 °C)  Pulse:  [46-85] 60  Resp:  [16-22] 19  SpO2:  [86 %-98 %] 98 %  BP: (138-173)/(61-91) 138/61     Weight: (!) 190 kg (418 lb 14 oz)  Body mass index is 61.86 kg/m².    Physical Exam   Constitutional: He is oriented to person, place, and time.   Morbidly obese male with  pain    HENT:   Head: Normocephalic and atraumatic.   Nose: Nose normal.   Mouth/Throat: Oropharynx is clear and moist.   Eyes: Pupils are equal, round, and reactive to light. Conjunctivae and EOM are normal.   Neck: Normal range of motion. Neck supple. No JVD present. No thyromegaly present.   Cardiovascular: Normal rate, regular rhythm, normal heart sounds and intact distal pulses.   Pulmonary/Chest: Effort normal and breath sounds normal.           Pain area ; radiation to front ; no rash ; no redness   No tenderness upon palpation    Abdominal: Soft. Bowel sounds are normal. He exhibits no  distension and no mass. There is no tenderness. There is no guarding.   Musculoskeletal: Normal range of motion. He exhibits no edema.   Lymphadenopathy:     He has no cervical adenopathy.   Neurological: He is alert and oriented to person, place, and time. He has normal reflexes. No cranial nerve deficit.   Skin: Skin is warm and dry. No rash noted. No pallor.   Psychiatric: He has a normal mood and affect.   Nursing note and vitals reviewed.        CRANIAL NERVES     CN III, IV, VI   Pupils are equal, round, and reactive to light.  Extraocular motions are normal.        Significant Labs:   A1C:   Recent Labs   Lab 04/23/19  1811 08/06/19  1552 09/03/19  1725   HGBA1C 9.6* 8.7* 10.2*     CBC:   Recent Labs   Lab 10/14/19  1252 10/15/19  0307   WBC 7.85 8.16   HGB 11.5* 10.5*   HCT 37.3* 34.9*    214     CMP:   Recent Labs   Lab 10/14/19  1252 10/15/19  0307    142   K 3.8 4.2    103   CO2 29 29   * 198*   BUN 17 18   CREATININE 0.9 1.0   CALCIUM 8.7 8.6*   PROT 6.6 6.1   ALBUMIN 3.4* 3.1*   BILITOT 0.5 0.4   ALKPHOS 61 59   AST 14 11   ALT 18 16   ANIONGAP 9 10   EGFRNONAA >60 >60     Lab Results   Component Value Date    DDIMER 0.32 10/14/2019         Troponin:   Recent Labs   Lab 10/14/19  2106 10/15/19  0307 10/15/19  0854   TROPONINI 0.122* 0.121* 0.090*     BNP  Recent Labs   Lab 10/14/19  1252   BNP 49     Significant Imaging: CXR: I have reviewed all pertinent results/findings within the past 24 hours and my personal findings are:  The heart is mildly enlarged.  There is central pulmonary vascular congestion and suspected mild edema.  No consolidation or pleural effusion.  Skeletal structures are intact.  Cervical fusion hardware is seen.  Past Medical History:   Diagnosis Date    Acid reflux     Anticoagulant long-term use     Eliquis for a-fib    Anxiety disorder     Atrial fibrillation     Back pain     Coronary artery disease     Diabetes     Diabetes mellitus      Diabetes mellitus, type 2     HTN (hypertension)     Hypercholesteremia     Hypertension     Migraine     Myocardial infarction     Respiratory distress     chronic cough    Sleep apnea        Past Surgical History:   Procedure Laterality Date    ANKLE SURGERY      ANKLE SURGERY Bilateral     ANTERIOR CERVICAL DISCECTOMY W/ FUSION      carpal tunnel both hands      ESOPHAGOGASTRODUODENOSCOPY N/A 9/5/2019    Procedure: EGD (ESOPHAGOGASTRODUODENOSCOPY);  Surgeon: Ciro Angulo MD;  Location[facility];  Service: Endoscopy;  Laterality: N/A;    HERNIA REPAIR      NECK SURGERY      SHOULDER SURGERY      SHOULDER SURGERY Bilateral     UVULOPALATOPHARYNGOPLASTY      WRIST SURGERY Bilateral        Review of patient's allergies indicates:   Allergen Reactions    Spironolactone      gynecomatia       No current facility-administered medications on file prior to encounter.      Current Outpatient Medications on File Prior to Encounter   Medication Sig    albuterol (PROVENTIL) 2.5 mg /3 mL (0.083 %) nebulizer solution Take 2.5 mg by nebulization every 6 (six) hours as needed.    albuterol (PROVENTIL/VENTOLIN HFA) 90 mcg/actuation inhaler Inhale 2 puffs into the lungs every 6 (six) hours as needed for Wheezing (COUGH).    ALPRAZolam (XANAX) 0.5 MG tablet TAKE ONE TABLET BY MOUTH EVERY DAY AS NEEDED (Patient taking differently: Take 0.5 mg by mouth nightly as needed. )    apixaban (ELIQUIS) 5 mg Tab Take 5 mg by mouth 2 (two) times daily.    aspirin 81 MG Chew Take 81 mg by mouth once daily.    carvedilol (COREG) 12.5 MG tablet TAKE ONE TABLET BY MOUTH TWICE DAILY (Patient taking differently: Take 12.5 mg by mouth 2 (two) times daily. )    fluticasone-vilanterol (BREO ELLIPTA) 200-25 mcg/dose DsDv diskus inhaler Inhale 1 puff into the lungs once daily. Controller    furosemide (LASIX) 40 MG tablet Take 1 tablet (40 mg total) by mouth once daily.    hydrALAZINE (APRESOLINE) 50 MG tablet Take 1  tablet (50 mg total) by mouth every 12 (twelve) hours.    HYDROcodone-acetaminophen (NORCO) 7.5-325 mg per tablet Take 1 tablet by mouth every 12 (twelve) hours as needed for Pain.    metFORMIN (GLUCOPHAGE) 1000 MG tablet Take 1,000 mg by mouth 2 (two) times daily with meals.    montelukast (SINGULAIR) 10 mg tablet TAKE ONE TABLET BY MOUTH EVERY EVENING (Patient taking differently: Take 10 mg by mouth every evening. )    pantoprazole (PROTONIX) 40 MG tablet TAKE ONE TABLET BY MOUTH EVERY MORNING (Patient taking differently: Take 40 mg by mouth 2 (two) times daily. )    pravastatin (PRAVACHOL) 40 MG tablet Take 1 tablet (40 mg total) by mouth nightly.    insulin glargine (LANTUS U-100 INSULIN) 100 unit/mL injection INJECT 63 UNITS UNDER SKIN TWICE DAILY (Patient taking differently: Inject 60 Units into the skin 2 (two) times daily. )    insulin lispro (HUMALOG U-100 INSULIN) 100 unit/mL injection INJECT 30 UNITS INTO THE SKIN THREE TIMES DAILY BEFORE MEALS (Patient taking differently: Inject 30 Units into the skin 3 (three) times daily before meals. )    insulin syringe-needle U-100 1 mL 31 gauge x 5/16 Syrg USE AS DIRECTED 5 times a day    [DISCONTINUED] cyclobenzaprine (FLEXERIL) 10 MG tablet Take 1 tablet (10 mg total) by mouth every 8 (eight) hours as needed for Muscle spasms.     Family History     Problem Relation (Age of Onset)    Cancer Father    No Known Problems Mother        Tobacco Use    Smoking status: Never Smoker    Smokeless tobacco: Never Used   Substance and Sexual Activity    Alcohol use: Yes     Comment: seldom    Drug use: Yes     Types: Hydrocodone    Sexual activity: Not Currently     Review of Systems   Constitutional: Negative for chills and fever.   HENT: Negative for congestion, postnasal drip and sore throat.    Eyes: Negative for photophobia.   Respiratory: Negative for chest tightness and shortness of breath.    Cardiovascular: Positive for chest pain.        Left scapular  area pain ;radiates to front    Gastrointestinal: Negative for abdominal distention, abdominal pain, blood in stool and vomiting.        Some swallowing dysfunction    Genitourinary: Negative for dysuria, flank pain and hematuria.   Musculoskeletal: Negative for back pain.   Skin: Negative for pallor.   Neurological: Negative for dizziness, seizures, facial asymmetry, speech difficulty and numbness.   Hematological: Does not bruise/bleed easily.   Psychiatric/Behavioral: Negative for agitation and suicidal ideas. The patient is not nervous/anxious.      Objective:     Vital Signs (Most Recent):  Temp: 97.3 °F (36.3 °C) (10/15/19 1115)  Pulse: 60 (10/15/19 1115)  Resp: 19 (10/15/19 1115)  BP: 138/61 (10/15/19 1115)  SpO2: 98 % (10/15/19 1115) Vital Signs (24h Range):  Temp:  [96.1 °F (35.6 °C)-98.8 °F (37.1 °C)] 97.3 °F (36.3 °C)  Pulse:  [46-85] 60  Resp:  [16-22] 19  SpO2:  [86 %-98 %] 98 %  BP: (138-173)/(61-91) 138/61     Weight: (!) 190 kg (418 lb 14 oz)  Body mass index is 61.86 kg/m².    Physical Exam   Constitutional: He is oriented to person, place, and time.   Morbidly obese male with  pain    HENT:   Head: Normocephalic and atraumatic.   Nose: Nose normal.   Mouth/Throat: Oropharynx is clear and moist.   Eyes: Pupils are equal, round, and reactive to light. Conjunctivae and EOM are normal.   Neck: Normal range of motion. Neck supple. No JVD present. No thyromegaly present.   Cardiovascular: Normal rate, regular rhythm, normal heart sounds and intact distal pulses.   Pulmonary/Chest: Effort normal and breath sounds normal.           Pain area ; radiation to front ; no rash ; no redness   No tenderness upon palpation    Abdominal: Soft. Bowel sounds are normal. He exhibits no distension and no mass. There is no tenderness. There is no guarding.   Musculoskeletal: Normal range of motion. He exhibits no edema.   Lymphadenopathy:     He has no cervical adenopathy.   Neurological: He is alert and oriented to  person, place, and time. He has normal reflexes. No cranial nerve deficit.   Skin: Skin is warm and dry. No rash noted. No pallor.   Psychiatric: He has a normal mood and affect.   Nursing note and vitals reviewed.        CRANIAL NERVES     CN III, IV, VI   Pupils are equal, round, and reactive to light.  Extraocular motions are normal.        Significant Labs:   A1C:   Recent Labs   Lab 04/23/19  1811 08/06/19  1552 09/03/19  1725   HGBA1C 9.6* 8.7* 10.2*     CBC:   Recent Labs   Lab 10/14/19  1252 10/15/19  0307   WBC 7.85 8.16   HGB 11.5* 10.5*   HCT 37.3* 34.9*    214     CMP:   Recent Labs   Lab 10/14/19  1252 10/15/19  0307    142   K 3.8 4.2    103   CO2 29 29   * 198*   BUN 17 18   CREATININE 0.9 1.0   CALCIUM 8.7 8.6*   PROT 6.6 6.1   ALBUMIN 3.4* 3.1*   BILITOT 0.5 0.4   ALKPHOS 61 59   AST 14 11   ALT 18 16   ANIONGAP 9 10   EGFRNONAA >60 >60     Lab Results   Component Value Date    DDIMER 0.32 10/14/2019         Troponin:   Recent Labs   Lab 10/14/19  2106 10/15/19  0307 10/15/19  0854   TROPONINI 0.122* 0.121* 0.090*     BNP  Recent Labs   Lab 10/14/19  1252   BNP 49     Significant Imaging: CXR: I have reviewed all pertinent results/findings within the past 24 hours and my personal findings are:  The heart is mildly enlarged.  There is central pulmonary vascular congestion and suspected mild edema.  No consolidation or pleural effusion.  Skeletal structures are intact.  Cervical fusion hardware is seen.  Review of Systems   Constitutional: Negative for chills and fever.   HENT: Negative for congestion, postnasal drip and sore throat.    Eyes: Negative for photophobia.   Respiratory: Negative for chest tightness and shortness of breath.    Cardiovascular: Positive for chest pain.        Left scapular area pain ;radiates to front    Gastrointestinal: Negative for abdominal distention, abdominal pain, blood in stool and vomiting.        Some swallowing dysfunction     Genitourinary: Negative for dysuria, flank pain and hematuria.   Musculoskeletal: Negative for back pain.   Skin: Negative for pallor.   Neurological: Negative for dizziness, seizures, facial asymmetry, speech difficulty and numbness.   Hematological: Does not bruise/bleed easily.   Psychiatric/Behavioral: Negative for agitation and suicidal ideas. The patient is not nervous/anxious.      Objective:     Vital Signs (Most Recent):  Temp: 97.3 °F (36.3 °C) (10/15/19 1115)  Pulse: 60 (10/15/19 1115)  Resp: 19 (10/15/19 1115)  BP: 138/61 (10/15/19 1115)  SpO2: 98 % (10/15/19 1115) Vital Signs (24h Range):  Temp:  [96.1 °F (35.6 °C)-98.8 °F (37.1 °C)] 97.3 °F (36.3 °C)  Pulse:  [46-85] 60  Resp:  [16-22] 19  SpO2:  [86 %-98 %] 98 %  BP: (138-173)/(61-91) 138/61     Weight: (!) 190 kg (418 lb 14 oz)  Body mass index is 61.86 kg/m².    Physical Exam   Constitutional: He is oriented to person, place, and time.   Morbidly obese male with  pain    HENT:   Head: Normocephalic and atraumatic.   Nose: Nose normal.   Mouth/Throat: Oropharynx is clear and moist.   Eyes: Pupils are equal, round, and reactive to light. Conjunctivae and EOM are normal.   Neck: Normal range of motion. Neck supple. No JVD present. No thyromegaly present.   Cardiovascular: Normal rate, regular rhythm, normal heart sounds and intact distal pulses.   Pulmonary/Chest: Effort normal and breath sounds normal.           Pain area ; radiation to front ; no rash ; no redness   No tenderness upon palpation    Abdominal: Soft. Bowel sounds are normal. He exhibits no distension and no mass. There is no tenderness. There is no guarding.   Musculoskeletal: Normal range of motion. He exhibits no edema.   Lymphadenopathy:     He has no cervical adenopathy.   Neurological: He is alert and oriented to person, place, and time. He has normal reflexes. No cranial nerve deficit.   Skin: Skin is warm and dry. No rash noted. No pallor.   Psychiatric: He has a normal mood  and affect.   Nursing note and vitals reviewed.        CRANIAL NERVES     CN III, IV, VI   Pupils are equal, round, and reactive to light.  Extraocular motions are normal.        Significant Labs:   A1C:   Recent Labs   Lab 04/23/19  1811 08/06/19  1552 09/03/19  1725   HGBA1C 9.6* 8.7* 10.2*     CBC:   Recent Labs   Lab 10/14/19  1252 10/15/19  0307   WBC 7.85 8.16   HGB 11.5* 10.5*   HCT 37.3* 34.9*    214     CMP:   Recent Labs   Lab 10/14/19  1252 10/15/19  0307    142   K 3.8 4.2    103   CO2 29 29   * 198*   BUN 17 18   CREATININE 0.9 1.0   CALCIUM 8.7 8.6*   PROT 6.6 6.1   ALBUMIN 3.4* 3.1*   BILITOT 0.5 0.4   ALKPHOS 61 59   AST 14 11   ALT 18 16   ANIONGAP 9 10   EGFRNONAA >60 >60     Lab Results   Component Value Date    DDIMER 0.32 10/14/2019     Recent Labs   Lab 10/14/19  1549  10/15/19  0854   CPK 62  62  --   --    CPKMB 1.5  --   --    TROPONINI 0.186*   < > 0.090*   MB 2.4  --   --     < > = values in this interval not displayed.     Lab Results   Component Value Date    INR 1.0 10/14/2019    INR 1.0 09/13/2017    INR 1.5 (H) 11/25/2016       BNP  Recent Labs   Lab 10/14/19  1252   BNP 49     Significant Imaging:     CXR The heart is mildly enlarged.  There is central pulmonary vascular congestion and suspected mild edema.  No consolidation or pleural effusion.  Skeletal structures are intact.  Cervical fusion hardware is seen    10/14 EKG The heart is mildly enlarged.  There is central pulmonary vascular congestion and suspected mild edema.  No consolidation or pleural effusion.  Skeletal structures are intact.  Cervical fusion hardware is seen    10/15 EKG Sinus bradycardia with 1st degree A-V block  Low voltage QRS  Septal infarct ,age undetermined  Abnormal ECG  When compared with ECG of 14-OCT-2019 12:38,  Nonspecific T wave abnormality has replaced inverted T waves in Inferior leads  Past Medical History:   Diagnosis Date    Acid reflux     Anticoagulant long-term  use     Eliquis for a-fib    Anxiety disorder     Atrial fibrillation     Back pain     Coronary artery disease     Diabetes     Diabetes mellitus     Diabetes mellitus, type 2     HTN (hypertension)     Hypercholesteremia     Hypertension     Migraine     Myocardial infarction     Respiratory distress     chronic cough    Sleep apnea        Past Surgical History:   Procedure Laterality Date    ANKLE SURGERY      ANKLE SURGERY Bilateral     ANTERIOR CERVICAL DISCECTOMY W/ FUSION      carpal tunnel both hands      ESOPHAGOGASTRODUODENOSCOPY N/A 9/5/2019    Procedure: EGD (ESOPHAGOGASTRODUODENOSCOPY);  Surgeon: Ciro Angulo MD;  Location[facility];  Service: Endoscopy;  Laterality: N/A;    HERNIA REPAIR      NECK SURGERY      SHOULDER SURGERY      SHOULDER SURGERY Bilateral     UVULOPALATOPHARYNGOPLASTY      WRIST SURGERY Bilateral        Review of patient's allergies indicates:   Allergen Reactions    Spironolactone      gynecomatia       No current facility-administered medications on file prior to encounter.      Current Outpatient Medications on File Prior to Encounter   Medication Sig    albuterol (PROVENTIL) 2.5 mg /3 mL (0.083 %) nebulizer solution Take 2.5 mg by nebulization every 6 (six) hours as needed.    albuterol (PROVENTIL/VENTOLIN HFA) 90 mcg/actuation inhaler Inhale 2 puffs into the lungs every 6 (six) hours as needed for Wheezing (COUGH).    ALPRAZolam (XANAX) 0.5 MG tablet TAKE ONE TABLET BY MOUTH EVERY DAY AS NEEDED (Patient taking differently: Take 0.5 mg by mouth nightly as needed. )    apixaban (ELIQUIS) 5 mg Tab Take 5 mg by mouth 2 (two) times daily.    aspirin 81 MG Chew Take 81 mg by mouth once daily.    carvedilol (COREG) 12.5 MG tablet TAKE ONE TABLET BY MOUTH TWICE DAILY (Patient taking differently: Take 12.5 mg by mouth 2 (two) times daily. )    fluticasone-vilanterol (BREO ELLIPTA) 200-25 mcg/dose DsDv diskus inhaler Inhale 1 puff into the lungs  once daily. Controller    furosemide (LASIX) 40 MG tablet Take 1 tablet (40 mg total) by mouth once daily.    hydrALAZINE (APRESOLINE) 50 MG tablet Take 1 tablet (50 mg total) by mouth every 12 (twelve) hours.    HYDROcodone-acetaminophen (NORCO) 7.5-325 mg per tablet Take 1 tablet by mouth every 12 (twelve) hours as needed for Pain.    metFORMIN (GLUCOPHAGE) 1000 MG tablet Take 1,000 mg by mouth 2 (two) times daily with meals.    montelukast (SINGULAIR) 10 mg tablet TAKE ONE TABLET BY MOUTH EVERY EVENING (Patient taking differently: Take 10 mg by mouth every evening. )    pantoprazole (PROTONIX) 40 MG tablet TAKE ONE TABLET BY MOUTH EVERY MORNING (Patient taking differently: Take 40 mg by mouth 2 (two) times daily. )    pravastatin (PRAVACHOL) 40 MG tablet Take 1 tablet (40 mg total) by mouth nightly.    insulin glargine (LANTUS U-100 INSULIN) 100 unit/mL injection INJECT 63 UNITS UNDER SKIN TWICE DAILY (Patient taking differently: Inject 60 Units into the skin 2 (two) times daily. )    insulin lispro (HUMALOG U-100 INSULIN) 100 unit/mL injection INJECT 30 UNITS INTO THE SKIN THREE TIMES DAILY BEFORE MEALS (Patient taking differently: Inject 30 Units into the skin 3 (three) times daily before meals. )    insulin syringe-needle U-100 1 mL 31 gauge x 5/16 Syrg USE AS DIRECTED 5 times a day    [DISCONTINUED] cyclobenzaprine (FLEXERIL) 10 MG tablet Take 1 tablet (10 mg total) by mouth every 8 (eight) hours as needed for Muscle spasms.     Family History     Problem Relation (Age of Onset)    Cancer Father    No Known Problems Mother        Tobacco Use    Smoking status: Never Smoker    Smokeless tobacco: Never Used   Substance and Sexual Activity    Alcohol use: Yes     Comment: seldom    Drug use: Yes     Types: Hydrocodone    Sexual activity: Not Currently     Review of Systems   Constitutional: Negative for chills and fever.   HENT: Negative for congestion, postnasal drip and sore throat.    Eyes:  Negative for photophobia.   Respiratory: Negative for chest tightness and shortness of breath.    Cardiovascular: Positive for chest pain.        Left scapular area pain ;radiates to front    Gastrointestinal: Negative for abdominal distention, abdominal pain, blood in stool and vomiting.        Some swallowing dysfunction    Genitourinary: Negative for dysuria, flank pain and hematuria.   Musculoskeletal: Negative for back pain.   Skin: Negative for pallor.   Neurological: Negative for dizziness, seizures, facial asymmetry, speech difficulty and numbness.   Hematological: Does not bruise/bleed easily.   Psychiatric/Behavioral: Negative for agitation and suicidal ideas. The patient is not nervous/anxious.      Objective:     Vital Signs (Most Recent):  Temp: 97.3 °F (36.3 °C) (10/15/19 1115)  Pulse: 60 (10/15/19 1115)  Resp: 19 (10/15/19 1115)  BP: 138/61 (10/15/19 1115)  SpO2: 98 % (10/15/19 1115) Vital Signs (24h Range):  Temp:  [96.1 °F (35.6 °C)-98.8 °F (37.1 °C)] 97.3 °F (36.3 °C)  Pulse:  [46-85] 60  Resp:  [16-22] 19  SpO2:  [86 %-98 %] 98 %  BP: (138-173)/(61-91) 138/61     Weight: (!) 190 kg (418 lb 14 oz)  Body mass index is 61.86 kg/m².    Physical Exam   Constitutional: He is oriented to person, place, and time.   Morbidly obese male with  pain    HENT:   Head: Normocephalic and atraumatic.   Nose: Nose normal.   Mouth/Throat: Oropharynx is clear and moist.   Eyes: Pupils are equal, round, and reactive to light. Conjunctivae and EOM are normal.   Neck: Normal range of motion. Neck supple. No JVD present. No thyromegaly present.   Cardiovascular: Normal rate, regular rhythm, normal heart sounds and intact distal pulses.   Pulmonary/Chest: Effort normal and breath sounds normal.           Pain area ; radiation to front ; no rash ; no redness   No tenderness upon palpation    Abdominal: Soft. Bowel sounds are normal. He exhibits no distension and no mass. There is no tenderness. There is no guarding.    Musculoskeletal: Normal range of motion. He exhibits no edema.   Lymphadenopathy:     He has no cervical adenopathy.   Neurological: He is alert and oriented to person, place, and time. He has normal reflexes. No cranial nerve deficit.   Skin: Skin is warm and dry. No rash noted. No pallor.   Psychiatric: He has a normal mood and affect.   Nursing note and vitals reviewed.        CRANIAL NERVES     CN III, IV, VI   Pupils are equal, round, and reactive to light.  Extraocular motions are normal.        Significant Labs:   A1C:   Recent Labs   Lab 04/23/19  1811 08/06/19  1552 09/03/19  1725   HGBA1C 9.6* 8.7* 10.2*     CBC:   Recent Labs   Lab 10/14/19  1252 10/15/19  0307   WBC 7.85 8.16   HGB 11.5* 10.5*   HCT 37.3* 34.9*    214     CMP:   Recent Labs   Lab 10/14/19  1252 10/15/19  0307    142   K 3.8 4.2    103   CO2 29 29   * 198*   BUN 17 18   CREATININE 0.9 1.0   CALCIUM 8.7 8.6*   PROT 6.6 6.1   ALBUMIN 3.4* 3.1*   BILITOT 0.5 0.4   ALKPHOS 61 59   AST 14 11   ALT 18 16   ANIONGAP 9 10   EGFRNONAA >60 >60     Lab Results   Component Value Date    DDIMER 0.32 10/14/2019         Troponin:   Recent Labs   Lab 10/14/19  2106 10/15/19  0307 10/15/19  0854   TROPONINI 0.122* 0.121* 0.090*     BNP  Recent Labs   Lab 10/14/19  1252   BNP 49     Significant Imaging: CXR: I have reviewed all pertinent results/findings within the past 24 hours and my personal findings are:  The heart is mildly enlarged.  There is central pulmonary vascular congestion and suspected mild edema.  No consolidation or pleural effusion.  Skeletal structures are intact.  Cervical fusion hardware is seen.    Review of Systems  Objective:     Vital Signs (Most Recent):  Temp: 97.3 °F (36.3 °C) (10/15/19 1115)  Pulse: 60 (10/15/19 1115)  Resp: 19 (10/15/19 1115)  BP: 138/61 (10/15/19 1115)  SpO2: 98 % (10/15/19 1115) Vital Signs (24h Range):  Temp:  [96.1 °F (35.6 °C)-98.8 °F (37.1 °C)] 97.3 °F (36.3 °C)  Pulse:   [46-85] 60  Resp:  [16-22] 19  SpO2:  [86 %-98 %] 98 %  BP: (138-173)/(61-91) 138/61     Weight: (!) 190 kg (418 lb 14 oz)  Body mass index is 61.86 kg/m².    Intake/Output Summary (Last 24 hours) at 10/15/2019 1135  Last data filed at 10/14/2019 1800  Gross per 24 hour   Intake 240 ml   Output --   Net 240 ml      Physical Exam    Significant Labs:   CBC:   Recent Labs   Lab 10/14/19  1252 10/15/19  0307   WBC 7.85 8.16   HGB 11.5* 10.5*   HCT 37.3* 34.9*    214     CMP:   Recent Labs   Lab 10/14/19  1252 10/15/19  0307    142   K 3.8 4.2    103   CO2 29 29   * 198*   BUN 17 18   CREATININE 0.9 1.0   CALCIUM 8.7 8.6*   PROT 6.6 6.1   ALBUMIN 3.4* 3.1*   BILITOT 0.5 0.4   ALKPHOS 61 59   AST 14 11   ALT 18 16   ANIONGAP 9 10   EGFRNONAA >60 >60     Cardiac Markers:   Recent Labs   Lab 10/14/19  1252   BNP 49       Significant Imaging: CT: I have reviewed all pertinent results/findings within the past 24 hours and my personal findings are:  COPD  CXR: I have reviewed all pertinent results/findings within the past 24 hours and my personal findings are:        Assessment/Plan:      Chronic respiratory failure        COPD with acute exacerbation        Obesity hypoventilation syndrome    # The patient is asked to make an attempt to improve diet and exercise patterns to aid in medical management of this problem.     # Eat  5 small meals a day.     # Cut out high carbohydrate  foods : bread, rice, pasta, potatoes.     # Exercise/walk 5x/week for at least 30-45  minutes.            Moderate persistent asthma without complication  Cont advair  Nebs every 4hr      Essential (primary) hypertension    Resume coreg, lasix, hydralazine  bp 146//81    Diastolic dysfunction with acute on chronic heart failure  BNP normal   Resume lasix         Type 2 diabetes mellitus with diabetic polyneuropathy, with long-term current use of insulin  accu check  -258  Correction scale  Hold metformin      Resume home routine on d/c      Obstructive sleep apnea  He uses cpap at home   Dr erickson ordering trilogy for home  Does not need for d/c      Dyslipidemia  Resume statin       Chronic atrial fibrillation  Sinus rhythm with 1 degree   On eliquis and coreg   He 49-68    Coronary artery disease involving native coronary artery of native heart without angina pectoris    Resume asa, pravastatin      VTE Risk Mitigation (From admission, onward)         Ordered     apixaban tablet 5 mg  2 times daily      10/14/19 1807     IP VTE HIGH RISK PATIENT  Once      10/14/19 1744     Place sequential compression device  Until discontinued      10/14/19 1744                      Norris Suresh MD  Department of Hospital Medicine   [facility]

## 2019-10-15 NOTE — ASSESSMENT & PLAN NOTE
Severe obesity with secondary pulmonary hypertension from nocturnal and daytime hypoxia cpap failed

## 2019-10-15 NOTE — SUBJECTIVE & OBJECTIVE
Interval History: feels much better on trilogy    Objective:     Vital Signs (Most Recent):  Temp: 97 °F (36.1 °C) (10/15/19 0703)  Pulse: 68 (10/15/19 0914)  Resp: 18 (10/15/19 0914)  BP: (!) 146/70 (10/15/19 0703)  SpO2: 98 % (10/15/19 0914) Vital Signs (24h Range):  Temp:  [96.1 °F (35.6 °C)-98.8 °F (37.1 °C)] 97 °F (36.1 °C)  Pulse:  [46-85] 68  Resp:  [16-22] 18  SpO2:  [93 %-98 %] 98 %  BP: (146-173)/(64-91) 146/70     Weight: (!) 190 kg (418 lb 14 oz)  Body mass index is 61.86 kg/m².      Intake/Output Summary (Last 24 hours) at 10/15/2019 0921  Last data filed at 10/14/2019 1800  Gross per 24 hour   Intake 240 ml   Output --   Net 240 ml       Physical Exam   Constitutional: He is oriented to person, place, and time. He appears well-developed and well-nourished. He is cooperative.  Non-toxic appearance. He does not appear ill. No distress.   HENT:   Head: Normocephalic and atraumatic.   Right Ear: Hearing, tympanic membrane, external ear and ear canal normal.   Left Ear: Hearing, tympanic membrane, external ear and ear canal normal.   Nose: Nose normal. No mucosal edema, rhinorrhea or nasal deformity. No epistaxis. Right sinus exhibits no maxillary sinus tenderness and no frontal sinus tenderness. Left sinus exhibits no maxillary sinus tenderness and no frontal sinus tenderness.   Mouth/Throat: Uvula is midline, oropharynx is clear and moist and mucous membranes are normal. No trismus in the jaw. Normal dentition. No uvula swelling. No posterior oropharyngeal erythema.   Eyes: Conjunctivae and lids are normal. No scleral icterus.   Sclera clear bilat   Neck: Trachea normal, full passive range of motion without pain and phonation normal. Neck supple.   Cardiovascular: Normal rate, regular rhythm, normal heart sounds, intact distal pulses and normal pulses.   Pulmonary/Chest: No accessory muscle usage. He is in respiratory distress (mild to moderate). He has decreased breath sounds in the right upper field,  the right middle field, the right lower field, the left upper field, the left middle field and the left lower field. He has wheezes in the right middle field, the right lower field, the left middle field and the left lower field. He has rhonchi in the right middle field, the right lower field, the left middle field and the left lower field.           Abdominal: Soft. Normal appearance and bowel sounds are normal. He exhibits no distension. There is no tenderness.   Musculoskeletal: Normal range of motion. He exhibits no edema or deformity.   Neurological: He is alert and oriented to person, place, and time. He exhibits normal muscle tone. Coordination normal.   Skin: Skin is warm, dry and intact. He is not diaphoretic. No pallor.   Psychiatric: He has a normal mood and affect. His speech is normal and behavior is normal. Judgment and thought content normal. Cognition and memory are normal.   Nursing note and vitals reviewed.      Vents:  Oxygen Concentration (%): 35 (10/15/19 0703)    Lines/Drains/Airways     Peripheral Intravenous Line                 Peripheral IV - Single Lumen 10/14/19 1509 20 G Right Antecubital less than 1 day                Significant Labs:    CBC/Anemia Profile:  Recent Labs   Lab 10/14/19  1252 10/15/19  0307   WBC 7.85 8.16   HGB 11.5* 10.5*   HCT 37.3* 34.9*    214   MCV 84 86   RDW 14.8* 14.7*        Chemistries:  Recent Labs   Lab 10/14/19  1252 10/15/19  0307    142   K 3.8 4.2    103   CO2 29 29   BUN 17 18   CREATININE 0.9 1.0   CALCIUM 8.7 8.6*   ALBUMIN 3.4* 3.1*   PROT 6.6 6.1   BILITOT 0.5 0.4   ALKPHOS 61 59   ALT 18 16   AST 14 11       All pertinent labs within the past 24 hours have been reviewed.    Significant Imaging:  I have reviewed all pertinent imaging results/findings within the past 24 hours.

## 2019-10-15 NOTE — PLAN OF CARE
10/15/19 1013   OSORIO Message   Medicare Outpatient and Observation Notification regarding financial responsibility Given to patient/caregiver;Explained to patient/caregiver;Signed/date by patient/caregiver   Date OSORIO was signed 10/15/19   Time OSORIO was signed 0941

## 2019-10-15 NOTE — ASSESSMENT & PLAN NOTE
Needs trilogy  Patient requiring noninvasive home ventilator due to Chronic Respiratory Failure and COPD. Patient severely desaturates their PO2 during Sleep or while relaxing. Patient needs Non Invasive Ventilator day and night in order to decrease Work of Breathing and improve Oxygenation by recruitment and distension of the alveoli and terminal Bronchioles. Although patient's condition has improved since admit on BiPap, patient remains symptomatic on BiPap. Patient is compliant with home respiratory regimen but remains symptomatic. Patient could die at home without Non Invasive Ventilator or have frequent/increased admissions to hospital. AVAPS-AE mode of therapy is required for proper ventilatory support and chronic symptom management.   Severe bmi 61  Will most likely need a homeventilator since he has failed CPAP at home CO2 retainer in the 60's

## 2019-10-15 NOTE — ASSESSMENT & PLAN NOTE
Severe bmi 61  Will most likely need a homeventilator since he has failed CPAP at home CO2 retainer in the 60's

## 2019-10-15 NOTE — ASSESSMENT & PLAN NOTE
No rash ;   No hyperemia   Chest pain looks like muscular   Resume norco/ flexaril which he takes at home    D dimer was ok. POX >90% on RA, CXR clear  Still reports pain with moving or deep breathing.   Takes norco at home for pain and last had refill 9/29  Also takes bnenzo. He reports that he usually takes soma at home but out. Has not filled since 9/2. Usually gets rx from Dr Tucker his PCP. Margarita flexeril has helped here. Also on indomethicin TID

## 2019-10-15 NOTE — PLAN OF CARE
Pt finally decided to wear BIPAP. Settings charted and alarms on and functioning. Pt tolerating well.

## 2019-10-15 NOTE — ASSESSMENT & PLAN NOTE
No rash ;   No hyperemia   Chest pain looks like muscular   Resume norco/ flexaril which he takes at home    D dimer was ok. POX >90% on RA, CXR clear  Still reports pain with moving or deep breathing.   Takes norco at home for pain and last had refill 9/29  Also takes bnenzo. He reports that he usually takes soma at home but out. Has not filled since 9/2. Usually gets rx from Dr Tucker his PCP. Margarita flexeril has helped here. Also on indomethicin TID; will have him F U with his PCP

## 2019-10-15 NOTE — ASSESSMENT & PLAN NOTE
Evidenced by normal PH with CO2 in the 60's  Patient requiring noninvasive home ventilator due to Chronic Respiratory Failure and COPD. Patient severely desaturates their PO2 during Sleep or while relaxing. Patient needs Non Invasive Ventilator day and night in order to decrease Work of Breathing and improve Oxygenation by recruitment and distension of the alveoli and terminal Bronchioles. Although patient's condition has improved since admit on BiPap, patient remains symptomatic on BiPap. Patient is compliant with home respiratory regimen but remains symptomatic. Patient could die at home without Non Invasive Ventilator or have frequent/increased admissions to hospital. AVAPS-AE mode of therapy is required for proper ventilatory support and chronic symptom management.

## 2019-10-15 NOTE — HOSPITAL COURSE
Pt was placed in observation to R/O MI. VSS/afebrile. Troponin minimally elevated but declining. Dr Fuchs has cleared him. No chest pain. Still c/o back pain that has been persistent for days. No rash noted. Did have some desats with sleeping in ER. Has CPAP at home and Dr Neumann has seen him here. Ordering trilogy for home use. POX 77-86% on RA resting

## 2019-10-15 NOTE — PLAN OF CARE
10/15/19 1406   Final Note   Assessment Type Final Discharge Note   Anticipated Discharge Disposition Home   What phone number can be called within the next 1-3 days to see how you are doing after discharge? [phone]   Hospital Follow Up  Appt(s) scheduled? Yes   Discharge plans and expectations educations in teach back method with documentation complete? Yes       Patient will discharge with home oxygen and a trilogy for home use through [facility].     Susannah Humphrey LMSW

## 2019-10-15 NOTE — PLAN OF CARE
10/15/19 1404   Post-Acute Status   Post-Acute Authorization HME   HME Status Set-up Complete   Discharge Delays None known at this time       Spoke with patient and daughter who now agree for patient to have home oxygen and a trilogy. Informed Margy with [employer] and faxed patient's financial assistance documentation to [employer]. Margy states that she will put in the order for patient to receive portable O2 before discharge today.     Susannah Humphrey LMSW

## 2019-10-15 NOTE — PLAN OF CARE
10/15/19 1249   Post-Acute Status   Post-Acute Authorization HME   HME Status Family Barriers   Discharge Delays None known at this time       Patient spoke with Margy from [facility] who indicates that the patient can receive both oxygen and the trilogy for home use for $25 per month if he qualifies for their assistance program. Received fax of application for financial assistance from Margy. Patient's daughter in the room with patient and will assist patient in filling out application. Patient appears agitated that he needs equipment. He states that he has never needed oxygen and does not feel that he needs it now. Patient also states that his CPAP is paid for and doesn't want to pay monthly for a new machine. SW verbalized understanding and explained to patient that he does not have to get any equipment that he does not want. Patient will inform when he makes a decision.     Susannah Humphrey, SIMIN

## 2019-10-15 NOTE — SUBJECTIVE & OBJECTIVE
Interval History:   Past Medical History:   Diagnosis Date    Acid reflux     Anticoagulant long-term use     Eliquis for a-fib    Anxiety disorder     Atrial fibrillation     Back pain     Coronary artery disease     Diabetes     Diabetes mellitus     Diabetes mellitus, type 2     HTN (hypertension)     Hypercholesteremia     Hypertension     Migraine     Myocardial infarction     Respiratory distress     chronic cough    Sleep apnea        Past Surgical History:   Procedure Laterality Date    ANKLE SURGERY      ANKLE SURGERY Bilateral     ANTERIOR CERVICAL DISCECTOMY W/ FUSION      carpal tunnel both hands      ESOPHAGOGASTRODUODENOSCOPY N/A 9/5/2019    Procedure: EGD (ESOPHAGOGASTRODUODENOSCOPY);  Surgeon: Ciro Angulo MD;  Location: [facility] [place];  Service: Endoscopy;  Laterality: N/A;    HERNIA REPAIR      NECK SURGERY      SHOULDER SURGERY      SHOULDER SURGERY Bilateral     UVULOPALATOPHARYNGOPLASTY      WRIST SURGERY Bilateral        Review of patient's allergies indicates:   Allergen Reactions    Spironolactone      gynecomatia       No current facility-administered medications on file prior to encounter.      Current Outpatient Medications on File Prior to Encounter   Medication Sig    albuterol (PROVENTIL) 2.5 mg /3 mL (0.083 %) nebulizer solution Take 2.5 mg by nebulization every 6 (six) hours as needed.    albuterol (PROVENTIL/VENTOLIN HFA) 90 mcg/actuation inhaler Inhale 2 puffs into the lungs every 6 (six) hours as needed for Wheezing (COUGH).    ALPRAZolam (XANAX) 0.5 MG tablet TAKE ONE TABLET BY MOUTH EVERY DAY AS NEEDED (Patient taking differently: Take 0.5 mg by mouth nightly as needed. )    apixaban (ELIQUIS) 5 mg Tab Take 5 mg by mouth 2 (two) times daily.    aspirin 81 MG Chew Take 81 mg by mouth once daily.    carvedilol (COREG) 12.5 MG tablet TAKE ONE TABLET BY MOUTH TWICE DAILY (Patient taking differently: Take 12.5 mg by mouth 2 (two) times daily. )     fluticasone-vilanterol (BREO ELLIPTA) 200-25 mcg/dose DsDv diskus inhaler Inhale 1 puff into the lungs once daily. Controller    furosemide (LASIX) 40 MG tablet Take 1 tablet (40 mg total) by mouth once daily.    hydrALAZINE (APRESOLINE) 50 MG tablet Take 1 tablet (50 mg total) by mouth every 12 (twelve) hours.    HYDROcodone-acetaminophen (NORCO) 7.5-325 mg per tablet Take 1 tablet by mouth every 12 (twelve) hours as needed for Pain.    metFORMIN (GLUCOPHAGE) 1000 MG tablet Take 1,000 mg by mouth 2 (two) times daily with meals.    montelukast (SINGULAIR) 10 mg tablet TAKE ONE TABLET BY MOUTH EVERY EVENING (Patient taking differently: Take 10 mg by mouth every evening. )    pantoprazole (PROTONIX) 40 MG tablet TAKE ONE TABLET BY MOUTH EVERY MORNING (Patient taking differently: Take 40 mg by mouth 2 (two) times daily. )    pravastatin (PRAVACHOL) 40 MG tablet Take 1 tablet (40 mg total) by mouth nightly.    insulin glargine (LANTUS U-100 INSULIN) 100 unit/mL injection INJECT 63 UNITS UNDER SKIN TWICE DAILY (Patient taking differently: Inject 60 Units into the skin 2 (two) times daily. )    insulin lispro (HUMALOG U-100 INSULIN) 100 unit/mL injection INJECT 30 UNITS INTO THE SKIN THREE TIMES DAILY BEFORE MEALS (Patient taking differently: Inject 30 Units into the skin 3 (three) times daily before meals. )    insulin syringe-needle U-100 1 mL 31 gauge x 5/16 Syrg USE AS DIRECTED 5 times a day    [DISCONTINUED] cyclobenzaprine (FLEXERIL) 10 MG tablet Take 1 tablet (10 mg total) by mouth every 8 (eight) hours as needed for Muscle spasms.     Family History     Problem Relation (Age of Onset)    Cancer Father    No Known Problems Mother        Tobacco Use    Smoking status: Never Smoker    Smokeless tobacco: Never Used   Substance and Sexual Activity    Alcohol use: Yes     Comment: seldom    Drug use: Yes     Types: Hydrocodone    Sexual activity: Not Currently     Review of Systems   Constitutional:  Negative for chills and fever.   HENT: Negative for congestion, postnasal drip and sore throat.    Eyes: Negative for photophobia.   Respiratory: Negative for chest tightness and shortness of breath.    Cardiovascular: Positive for chest pain.        Left scapular area pain ;radiates to front    Gastrointestinal: Negative for abdominal distention, abdominal pain, blood in stool and vomiting.        Some swallowing dysfunction    Genitourinary: Negative for dysuria, flank pain and hematuria.   Musculoskeletal: Negative for back pain.   Skin: Negative for pallor.   Neurological: Negative for dizziness, seizures, facial asymmetry, speech difficulty and numbness.   Hematological: Does not bruise/bleed easily.   Psychiatric/Behavioral: Negative for agitation and suicidal ideas. The patient is not nervous/anxious.      Objective:     Vital Signs (Most Recent):  Temp: 97.3 °F (36.3 °C) (10/15/19 1115)  Pulse: 60 (10/15/19 1115)  Resp: 19 (10/15/19 1115)  BP: 138/61 (10/15/19 1115)  SpO2: 98 % (10/15/19 1115) Vital Signs (24h Range):  Temp:  [96.1 °F (35.6 °C)-98.8 °F (37.1 °C)] 97.3 °F (36.3 °C)  Pulse:  [46-85] 60  Resp:  [16-22] 19  SpO2:  [86 %-98 %] 98 %  BP: (138-173)/(61-91) 138/61     Weight: (!) 190 kg (418 lb 14 oz)  Body mass index is 61.86 kg/m².    Physical Exam   Constitutional: He is oriented to person, place, and time.   Morbidly obese male with  pain    HENT:   Head: Normocephalic and atraumatic.   Nose: Nose normal.   Mouth/Throat: Oropharynx is clear and moist.   Eyes: Pupils are equal, round, and reactive to light. Conjunctivae and EOM are normal.   Neck: Normal range of motion. Neck supple. No JVD present. No thyromegaly present.   Cardiovascular: Normal rate, regular rhythm, normal heart sounds and intact distal pulses.   Pulmonary/Chest: Effort normal and breath sounds normal.           Pain area ; radiation to front ; no rash ; no redness   No tenderness upon palpation    Abdominal: Soft. Bowel  sounds are normal. He exhibits no distension and no mass. There is no tenderness. There is no guarding.   Musculoskeletal: Normal range of motion. He exhibits no edema.   Lymphadenopathy:     He has no cervical adenopathy.   Neurological: He is alert and oriented to person, place, and time. He has normal reflexes. No cranial nerve deficit.   Skin: Skin is warm and dry. No rash noted. No pallor.   Psychiatric: He has a normal mood and affect.   Nursing note and vitals reviewed.        CRANIAL NERVES     CN III, IV, VI   Pupils are equal, round, and reactive to light.  Extraocular motions are normal.        Significant Labs:   A1C:   Recent Labs   Lab 04/23/19  1811 08/06/19  1552 09/03/19  1725   HGBA1C 9.6* 8.7* 10.2*     CBC:   Recent Labs   Lab 10/14/19  1252 10/15/19  0307   WBC 7.85 8.16   HGB 11.5* 10.5*   HCT 37.3* 34.9*    214     CMP:   Recent Labs   Lab 10/14/19  1252 10/15/19  0307    142   K 3.8 4.2    103   CO2 29 29   * 198*   BUN 17 18   CREATININE 0.9 1.0   CALCIUM 8.7 8.6*   PROT 6.6 6.1   ALBUMIN 3.4* 3.1*   BILITOT 0.5 0.4   ALKPHOS 61 59   AST 14 11   ALT 18 16   ANIONGAP 9 10   EGFRNONAA >60 >60     Lab Results   Component Value Date    DDIMER 0.32 10/14/2019         Troponin:   Recent Labs   Lab 10/14/19  2106 10/15/19  0307 10/15/19  0854   TROPONINI 0.122* 0.121* 0.090*     BNP  Recent Labs   Lab 10/14/19  1252   BNP 49     Significant Imaging: CXR: I have reviewed all pertinent results/findings within the past 24 hours and my personal findings are:  The heart is mildly enlarged.  There is central pulmonary vascular congestion and suspected mild edema.  No consolidation or pleural effusion.  Skeletal structures are intact.  Cervical fusion hardware is seen.  Past Medical History:   Diagnosis Date    Acid reflux     Anticoagulant long-term use     Eliquis for a-fib    Anxiety disorder     Atrial fibrillation     Back pain     Coronary artery disease     Diabetes      Diabetes mellitus     Diabetes mellitus, type 2     HTN (hypertension)     Hypercholesteremia     Hypertension     Migraine     Myocardial infarction     Respiratory distress     chronic cough    Sleep apnea        Past Surgical History:   Procedure Laterality Date    ANKLE SURGERY      ANKLE SURGERY Bilateral     ANTERIOR CERVICAL DISCECTOMY W/ FUSION      carpal tunnel both hands      ESOPHAGOGASTRODUODENOSCOPY N/A 9/5/2019    Procedure: EGD (ESOPHAGOGASTRODUODENOSCOPY);  Surgeon: Ciro Angulo MD;  [facility];  Service: Endoscopy;  Laterality: N/A;    HERNIA REPAIR      NECK SURGERY      SHOULDER SURGERY      SHOULDER SURGERY Bilateral     UVULOPALATOPHARYNGOPLASTY      WRIST SURGERY Bilateral        Review of patient's allergies indicates:   Allergen Reactions    Spironolactone      gynecomatia       No current facility-administered medications on file prior to encounter.      Current Outpatient Medications on File Prior to Encounter   Medication Sig    albuterol (PROVENTIL) 2.5 mg /3 mL (0.083 %) nebulizer solution Take 2.5 mg by nebulization every 6 (six) hours as needed.    albuterol (PROVENTIL/VENTOLIN HFA) 90 mcg/actuation inhaler Inhale 2 puffs into the lungs every 6 (six) hours as needed for Wheezing (COUGH).    ALPRAZolam (XANAX) 0.5 MG tablet TAKE ONE TABLET BY MOUTH EVERY DAY AS NEEDED (Patient taking differently: Take 0.5 mg by mouth nightly as needed. )    apixaban (ELIQUIS) 5 mg Tab Take 5 mg by mouth 2 (two) times daily.    aspirin 81 MG Chew Take 81 mg by mouth once daily.    carvedilol (COREG) 12.5 MG tablet TAKE ONE TABLET BY MOUTH TWICE DAILY (Patient taking differently: Take 12.5 mg by mouth 2 (two) times daily. )    fluticasone-vilanterol (BREO ELLIPTA) 200-25 mcg/dose DsDv diskus inhaler Inhale 1 puff into the lungs once daily. Controller    furosemide (LASIX) 40 MG tablet Take 1 tablet (40 mg total) by mouth once daily.    hydrALAZINE  (APRESOLINE) 50 MG tablet Take 1 tablet (50 mg total) by mouth every 12 (twelve) hours.    HYDROcodone-acetaminophen (NORCO) 7.5-325 mg per tablet Take 1 tablet by mouth every 12 (twelve) hours as needed for Pain.    metFORMIN (GLUCOPHAGE) 1000 MG tablet Take 1,000 mg by mouth 2 (two) times daily with meals.    montelukast (SINGULAIR) 10 mg tablet TAKE ONE TABLET BY MOUTH EVERY EVENING (Patient taking differently: Take 10 mg by mouth every evening. )    pantoprazole (PROTONIX) 40 MG tablet TAKE ONE TABLET BY MOUTH EVERY MORNING (Patient taking differently: Take 40 mg by mouth 2 (two) times daily. )    pravastatin (PRAVACHOL) 40 MG tablet Take 1 tablet (40 mg total) by mouth nightly.    insulin glargine (LANTUS U-100 INSULIN) 100 unit/mL injection INJECT 63 UNITS UNDER SKIN TWICE DAILY (Patient taking differently: Inject 60 Units into the skin 2 (two) times daily. )    insulin lispro (HUMALOG U-100 INSULIN) 100 unit/mL injection INJECT 30 UNITS INTO THE SKIN THREE TIMES DAILY BEFORE MEALS (Patient taking differently: Inject 30 Units into the skin 3 (three) times daily before meals. )    insulin syringe-needle U-100 1 mL 31 gauge x 5/16 Syrg USE AS DIRECTED 5 times a day    [DISCONTINUED] cyclobenzaprine (FLEXERIL) 10 MG tablet Take 1 tablet (10 mg total) by mouth every 8 (eight) hours as needed for Muscle spasms.     Family History     Problem Relation (Age of Onset)    Cancer Father    No Known Problems Mother        Tobacco Use    Smoking status: Never Smoker    Smokeless tobacco: Never Used   Substance and Sexual Activity    Alcohol use: Yes     Comment: seldom    Drug use: Yes     Types: Hydrocodone    Sexual activity: Not Currently     Review of Systems   Constitutional: Negative for chills and fever.   HENT: Negative for congestion, postnasal drip and sore throat.    Eyes: Negative for photophobia.   Respiratory: Negative for chest tightness and shortness of breath.    Cardiovascular: Positive for  chest pain.        Left scapular area pain ;radiates to front    Gastrointestinal: Negative for abdominal distention, abdominal pain, blood in stool and vomiting.        Some swallowing dysfunction    Genitourinary: Negative for dysuria, flank pain and hematuria.   Musculoskeletal: Negative for back pain.   Skin: Negative for pallor.   Neurological: Negative for dizziness, seizures, facial asymmetry, speech difficulty and numbness.   Hematological: Does not bruise/bleed easily.   Psychiatric/Behavioral: Negative for agitation and suicidal ideas. The patient is not nervous/anxious.      Objective:     Vital Signs (Most Recent):  Temp: 97.3 °F (36.3 °C) (10/15/19 1115)  Pulse: 60 (10/15/19 1115)  Resp: 19 (10/15/19 1115)  BP: 138/61 (10/15/19 1115)  SpO2: 98 % (10/15/19 1115) Vital Signs (24h Range):  Temp:  [96.1 °F (35.6 °C)-98.8 °F (37.1 °C)] 97.3 °F (36.3 °C)  Pulse:  [46-85] 60  Resp:  [16-22] 19  SpO2:  [86 %-98 %] 98 %  BP: (138-173)/(61-91) 138/61     Weight: (!) 190 kg (418 lb 14 oz)  Body mass index is 61.86 kg/m².    Physical Exam   Constitutional: He is oriented to person, place, and time.   Morbidly obese male with  pain    HENT:   Head: Normocephalic and atraumatic.   Nose: Nose normal.   Mouth/Throat: Oropharynx is clear and moist.   Eyes: Pupils are equal, round, and reactive to light. Conjunctivae and EOM are normal.   Neck: Normal range of motion. Neck supple. No JVD present. No thyromegaly present.   Cardiovascular: Normal rate, regular rhythm, normal heart sounds and intact distal pulses.   Pulmonary/Chest: Effort normal and breath sounds normal.           Pain area ; radiation to front ; no rash ; no redness   No tenderness upon palpation    Abdominal: Soft. Bowel sounds are normal. He exhibits no distension and no mass. There is no tenderness. There is no guarding.   Musculoskeletal: Normal range of motion. He exhibits no edema.   Lymphadenopathy:     He has no cervical adenopathy.    Neurological: He is alert and oriented to person, place, and time. He has normal reflexes. No cranial nerve deficit.   Skin: Skin is warm and dry. No rash noted. No pallor.   Psychiatric: He has a normal mood and affect.   Nursing note and vitals reviewed.        CRANIAL NERVES     CN III, IV, VI   Pupils are equal, round, and reactive to light.  Extraocular motions are normal.        Significant Labs:   A1C:   Recent Labs   Lab 04/23/19  1811 08/06/19  1552 09/03/19  1725   HGBA1C 9.6* 8.7* 10.2*     CBC:   Recent Labs   Lab 10/14/19  1252 10/15/19  0307   WBC 7.85 8.16   HGB 11.5* 10.5*   HCT 37.3* 34.9*    214     CMP:   Recent Labs   Lab 10/14/19  1252 10/15/19  0307    142   K 3.8 4.2    103   CO2 29 29   * 198*   BUN 17 18   CREATININE 0.9 1.0   CALCIUM 8.7 8.6*   PROT 6.6 6.1   ALBUMIN 3.4* 3.1*   BILITOT 0.5 0.4   ALKPHOS 61 59   AST 14 11   ALT 18 16   ANIONGAP 9 10   EGFRNONAA >60 >60     Lab Results   Component Value Date    DDIMER 0.32 10/14/2019         Troponin:   Recent Labs   Lab 10/14/19  2106 10/15/19  0307 10/15/19  0854   TROPONINI 0.122* 0.121* 0.090*     BNP  Recent Labs   Lab 10/14/19  1252   BNP 49     Significant Imaging: CXR: I have reviewed all pertinent results/findings within the past 24 hours and my personal findings are:  The heart is mildly enlarged.  There is central pulmonary vascular congestion and suspected mild edema.  No consolidation or pleural effusion.  Skeletal structures are intact.  Cervical fusion hardware is seen.  Review of Systems   Constitutional: Negative for chills and fever.   HENT: Negative for congestion, postnasal drip and sore throat.    Eyes: Negative for photophobia.   Respiratory: Negative for chest tightness and shortness of breath.    Cardiovascular: Positive for chest pain.        Left scapular area pain ;radiates to front    Gastrointestinal: Negative for abdominal distention, abdominal pain, blood in stool and vomiting.         Some swallowing dysfunction    Genitourinary: Negative for dysuria, flank pain and hematuria.   Musculoskeletal: Negative for back pain.   Skin: Negative for pallor.   Neurological: Negative for dizziness, seizures, facial asymmetry, speech difficulty and numbness.   Hematological: Does not bruise/bleed easily.   Psychiatric/Behavioral: Negative for agitation and suicidal ideas. The patient is not nervous/anxious.      Objective:     Vital Signs (Most Recent):  Temp: 97.3 °F (36.3 °C) (10/15/19 1115)  Pulse: 60 (10/15/19 1115)  Resp: 19 (10/15/19 1115)  BP: 138/61 (10/15/19 1115)  SpO2: 98 % (10/15/19 1115) Vital Signs (24h Range):  Temp:  [96.1 °F (35.6 °C)-98.8 °F (37.1 °C)] 97.3 °F (36.3 °C)  Pulse:  [46-85] 60  Resp:  [16-22] 19  SpO2:  [86 %-98 %] 98 %  BP: (138-173)/(61-91) 138/61     Weight: (!) 190 kg (418 lb 14 oz)  Body mass index is 61.86 kg/m².    Physical Exam   Constitutional: He is oriented to person, place, and time.   Morbidly obese male with  pain    HENT:   Head: Normocephalic and atraumatic.   Nose: Nose normal.   Mouth/Throat: Oropharynx is clear and moist.   Eyes: Pupils are equal, round, and reactive to light. Conjunctivae and EOM are normal.   Neck: Normal range of motion. Neck supple. No JVD present. No thyromegaly present.   Cardiovascular: Normal rate, regular rhythm, normal heart sounds and intact distal pulses.   Pulmonary/Chest: Effort normal and breath sounds normal.           Pain area ; radiation to front ; no rash ; no redness   No tenderness upon palpation    Abdominal: Soft. Bowel sounds are normal. He exhibits no distension and no mass. There is no tenderness. There is no guarding.   Musculoskeletal: Normal range of motion. He exhibits no edema.   Lymphadenopathy:     He has no cervical adenopathy.   Neurological: He is alert and oriented to person, place, and time. He has normal reflexes. No cranial nerve deficit.   Skin: Skin is warm and dry. No rash noted. No pallor.    Psychiatric: He has a normal mood and affect.   Nursing note and vitals reviewed.        CRANIAL NERVES     CN III, IV, VI   Pupils are equal, round, and reactive to light.  Extraocular motions are normal.        Significant Labs:   A1C:   Recent Labs   Lab 04/23/19  1811 08/06/19  1552 09/03/19  1725   HGBA1C 9.6* 8.7* 10.2*     CBC:   Recent Labs   Lab 10/14/19  1252 10/15/19  0307   WBC 7.85 8.16   HGB 11.5* 10.5*   HCT 37.3* 34.9*    214     CMP:   Recent Labs   Lab 10/14/19  1252 10/15/19  0307    142   K 3.8 4.2    103   CO2 29 29   * 198*   BUN 17 18   CREATININE 0.9 1.0   CALCIUM 8.7 8.6*   PROT 6.6 6.1   ALBUMIN 3.4* 3.1*   BILITOT 0.5 0.4   ALKPHOS 61 59   AST 14 11   ALT 18 16   ANIONGAP 9 10   EGFRNONAA >60 >60     Lab Results   Component Value Date    DDIMER 0.32 10/14/2019     Recent Labs   Lab 10/14/19  1549  10/15/19  0854   CPK 62  62  --   --    CPKMB 1.5  --   --    TROPONINI 0.186*   < > 0.090*   MB 2.4  --   --     < > = values in this interval not displayed.     Lab Results   Component Value Date    INR 1.0 10/14/2019    INR 1.0 09/13/2017    INR 1.5 (H) 11/25/2016       BNP  Recent Labs   Lab 10/14/19  1252   BNP 49     Significant Imaging:     CXR The heart is mildly enlarged.  There is central pulmonary vascular congestion and suspected mild edema.  No consolidation or pleural effusion.  Skeletal structures are intact.  Cervical fusion hardware is seen    10/14 EKG The heart is mildly enlarged.  There is central pulmonary vascular congestion and suspected mild edema.  No consolidation or pleural effusion.  Skeletal structures are intact.  Cervical fusion hardware is seen    10/15 EKG Sinus bradycardia with 1st degree A-V block  Low voltage QRS  Septal infarct ,age undetermined  Abnormal ECG  When compared with ECG of 14-OCT-2019 12:38,  Nonspecific T wave abnormality has replaced inverted T waves in Inferior leads  Past Medical History:   Diagnosis Date    Acid  reflux     Anticoagulant long-term use     Eliquis for a-fib    Anxiety disorder     Atrial fibrillation     Back pain     Coronary artery disease     Diabetes     Diabetes mellitus     Diabetes mellitus, type 2     HTN (hypertension)     Hypercholesteremia     Hypertension     Migraine     Myocardial infarction     Respiratory distress     chronic cough    Sleep apnea        Past Surgical History:   Procedure Laterality Date    ANKLE SURGERY      ANKLE SURGERY Bilateral     ANTERIOR CERVICAL DISCECTOMY W/ FUSION      carpal tunnel both hands      ESOPHAGOGASTRODUODENOSCOPY N/A 9/5/2019    Procedure: EGD (ESOPHAGOGASTRODUODENOSCOPY);  Surgeon: Ciro Angulo MD;  Location: [facility];  Service: Endoscopy;  Laterality: N/A;    HERNIA REPAIR      NECK SURGERY      SHOULDER SURGERY      SHOULDER SURGERY Bilateral     UVULOPALATOPHARYNGOPLASTY      WRIST SURGERY Bilateral        Review of patient's allergies indicates:   Allergen Reactions    Spironolactone      gynecomatia       No current facility-administered medications on file prior to encounter.      Current Outpatient Medications on File Prior to Encounter   Medication Sig    albuterol (PROVENTIL) 2.5 mg /3 mL (0.083 %) nebulizer solution Take 2.5 mg by nebulization every 6 (six) hours as needed.    albuterol (PROVENTIL/VENTOLIN HFA) 90 mcg/actuation inhaler Inhale 2 puffs into the lungs every 6 (six) hours as needed for Wheezing (COUGH).    ALPRAZolam (XANAX) 0.5 MG tablet TAKE ONE TABLET BY MOUTH EVERY DAY AS NEEDED (Patient taking differently: Take 0.5 mg by mouth nightly as needed. )    apixaban (ELIQUIS) 5 mg Tab Take 5 mg by mouth 2 (two) times daily.    aspirin 81 MG Chew Take 81 mg by mouth once daily.    carvedilol (COREG) 12.5 MG tablet TAKE ONE TABLET BY MOUTH TWICE DAILY (Patient taking differently: Take 12.5 mg by mouth 2 (two) times daily. )    fluticasone-vilanterol (BREO ELLIPTA) 200-25 mcg/dose DsDv diskus  inhaler Inhale 1 puff into the lungs once daily. Controller    furosemide (LASIX) 40 MG tablet Take 1 tablet (40 mg total) by mouth once daily.    hydrALAZINE (APRESOLINE) 50 MG tablet Take 1 tablet (50 mg total) by mouth every 12 (twelve) hours.    HYDROcodone-acetaminophen (NORCO) 7.5-325 mg per tablet Take 1 tablet by mouth every 12 (twelve) hours as needed for Pain.    metFORMIN (GLUCOPHAGE) 1000 MG tablet Take 1,000 mg by mouth 2 (two) times daily with meals.    montelukast (SINGULAIR) 10 mg tablet TAKE ONE TABLET BY MOUTH EVERY EVENING (Patient taking differently: Take 10 mg by mouth every evening. )    pantoprazole (PROTONIX) 40 MG tablet TAKE ONE TABLET BY MOUTH EVERY MORNING (Patient taking differently: Take 40 mg by mouth 2 (two) times daily. )    pravastatin (PRAVACHOL) 40 MG tablet Take 1 tablet (40 mg total) by mouth nightly.    insulin glargine (LANTUS U-100 INSULIN) 100 unit/mL injection INJECT 63 UNITS UNDER SKIN TWICE DAILY (Patient taking differently: Inject 60 Units into the skin 2 (two) times daily. )    insulin lispro (HUMALOG U-100 INSULIN) 100 unit/mL injection INJECT 30 UNITS INTO THE SKIN THREE TIMES DAILY BEFORE MEALS (Patient taking differently: Inject 30 Units into the skin 3 (three) times daily before meals. )    insulin syringe-needle U-100 1 mL 31 gauge x 5/16 Syrg USE AS DIRECTED 5 times a day    [DISCONTINUED] cyclobenzaprine (FLEXERIL) 10 MG tablet Take 1 tablet (10 mg total) by mouth every 8 (eight) hours as needed for Muscle spasms.     Family History     Problem Relation (Age of Onset)    Cancer Father    No Known Problems Mother        Tobacco Use    Smoking status: Never Smoker    Smokeless tobacco: Never Used   Substance and Sexual Activity    Alcohol use: Yes     Comment: seldom    Drug use: Yes     Types: Hydrocodone    Sexual activity: Not Currently     Review of Systems   Constitutional: Negative for chills and fever.   HENT: Negative for congestion,  postnasal drip and sore throat.    Eyes: Negative for photophobia.   Respiratory: Negative for chest tightness and shortness of breath.    Cardiovascular: Positive for chest pain.        Left scapular area pain ;radiates to front    Gastrointestinal: Negative for abdominal distention, abdominal pain, blood in stool and vomiting.        Some swallowing dysfunction    Genitourinary: Negative for dysuria, flank pain and hematuria.   Musculoskeletal: Negative for back pain.   Skin: Negative for pallor.   Neurological: Negative for dizziness, seizures, facial asymmetry, speech difficulty and numbness.   Hematological: Does not bruise/bleed easily.   Psychiatric/Behavioral: Negative for agitation and suicidal ideas. The patient is not nervous/anxious.      Objective:     Vital Signs (Most Recent):  Temp: 97.3 °F (36.3 °C) (10/15/19 1115)  Pulse: 60 (10/15/19 1115)  Resp: 19 (10/15/19 1115)  BP: 138/61 (10/15/19 1115)  SpO2: 98 % (10/15/19 1115) Vital Signs (24h Range):  Temp:  [96.1 °F (35.6 °C)-98.8 °F (37.1 °C)] 97.3 °F (36.3 °C)  Pulse:  [46-85] 60  Resp:  [16-22] 19  SpO2:  [86 %-98 %] 98 %  BP: (138-173)/(61-91) 138/61     Weight: (!) 190 kg (418 lb 14 oz)  Body mass index is 61.86 kg/m².    Physical Exam   Constitutional: He is oriented to person, place, and time.   Morbidly obese male with  pain    HENT:   Head: Normocephalic and atraumatic.   Nose: Nose normal.   Mouth/Throat: Oropharynx is clear and moist.   Eyes: Pupils are equal, round, and reactive to light. Conjunctivae and EOM are normal.   Neck: Normal range of motion. Neck supple. No JVD present. No thyromegaly present.   Cardiovascular: Normal rate, regular rhythm, normal heart sounds and intact distal pulses.   Pulmonary/Chest: Effort normal and breath sounds normal.           Pain area ; radiation to front ; no rash ; no redness   No tenderness upon palpation    Abdominal: Soft. Bowel sounds are normal. He exhibits no distension and no mass. There is no  tenderness. There is no guarding.   Musculoskeletal: Normal range of motion. He exhibits no edema.   Lymphadenopathy:     He has no cervical adenopathy.   Neurological: He is alert and oriented to person, place, and time. He has normal reflexes. No cranial nerve deficit.   Skin: Skin is warm and dry. No rash noted. No pallor.   Psychiatric: He has a normal mood and affect.   Nursing note and vitals reviewed.        CRANIAL NERVES     CN III, IV, VI   Pupils are equal, round, and reactive to light.  Extraocular motions are normal.        Significant Labs:   A1C:   Recent Labs   Lab 04/23/19  1811 08/06/19  1552 09/03/19  1725   HGBA1C 9.6* 8.7* 10.2*     CBC:   Recent Labs   Lab 10/14/19  1252 10/15/19  0307   WBC 7.85 8.16   HGB 11.5* 10.5*   HCT 37.3* 34.9*    214     CMP:   Recent Labs   Lab 10/14/19  1252 10/15/19  0307    142   K 3.8 4.2    103   CO2 29 29   * 198*   BUN 17 18   CREATININE 0.9 1.0   CALCIUM 8.7 8.6*   PROT 6.6 6.1   ALBUMIN 3.4* 3.1*   BILITOT 0.5 0.4   ALKPHOS 61 59   AST 14 11   ALT 18 16   ANIONGAP 9 10   EGFRNONAA >60 >60     Lab Results   Component Value Date    DDIMER 0.32 10/14/2019         Troponin:   Recent Labs   Lab 10/14/19  2106 10/15/19  0307 10/15/19  0854   TROPONINI 0.122* 0.121* 0.090*     BNP  Recent Labs   Lab 10/14/19  1252   BNP 49     Significant Imaging: CXR: I have reviewed all pertinent results/findings within the past 24 hours and my personal findings are:  The heart is mildly enlarged.  There is central pulmonary vascular congestion and suspected mild edema.  No consolidation or pleural effusion.  Skeletal structures are intact.  Cervical fusion hardware is seen.    Review of Systems  Objective:     Vital Signs (Most Recent):  Temp: 97.3 °F (36.3 °C) (10/15/19 1115)  Pulse: 60 (10/15/19 1115)  Resp: 19 (10/15/19 1115)  BP: 138/61 (10/15/19 1115)  SpO2: 98 % (10/15/19 1115) Vital Signs (24h Range):  Temp:  [96.1 °F (35.6 °C)-98.8 °F (37.1 °C)]  97.3 °F (36.3 °C)  Pulse:  [46-85] 60  Resp:  [16-22] 19  SpO2:  [86 %-98 %] 98 %  BP: (138-173)/(61-91) 138/61     Weight: (!) 190 kg (418 lb 14 oz)  Body mass index is 61.86 kg/m².    Intake/Output Summary (Last 24 hours) at 10/15/2019 1135  Last data filed at 10/14/2019 1800  Gross per 24 hour   Intake 240 ml   Output --   Net 240 ml      Physical Exam    Significant Labs:   CBC:   Recent Labs   Lab 10/14/19  1252 10/15/19  0307   WBC 7.85 8.16   HGB 11.5* 10.5*   HCT 37.3* 34.9*    214     CMP:   Recent Labs   Lab 10/14/19  1252 10/15/19  0307    142   K 3.8 4.2    103   CO2 29 29   * 198*   BUN 17 18   CREATININE 0.9 1.0   CALCIUM 8.7 8.6*   PROT 6.6 6.1   ALBUMIN 3.4* 3.1*   BILITOT 0.5 0.4   ALKPHOS 61 59   AST 14 11   ALT 18 16   ANIONGAP 9 10   EGFRNONAA >60 >60     Cardiac Markers:   Recent Labs   Lab 10/14/19  1252   BNP 49       Significant Imaging: CT: I have reviewed all pertinent results/findings within the past 24 hours and my personal findings are:  COPD  CXR: I have reviewed all pertinent results/findings within the past 24 hours and my personal findings are:

## 2019-10-15 NOTE — PLAN OF CARE
Pt was asked at 10 pm and again at midnight to place BIPAP on. Pt refused and stated he will call when he is ready to put it on.

## 2019-10-15 NOTE — CONSULTS
[facility]  Pulmonology  Consult Note    Patient Name: Jean Chao  MRN: [id number]  Admission Date: 10/14/2019  Hospital Length of Stay: 0 days  Code Status: Full Code  Attending Physician: Nura Randhawa MD  Primary Care Provider: Yomi Smith MD   Principal Problem: <principal problem not specified>    Consults  Subjective:     HPI:  Jean Chao is a 59 y.o. year old male that's presents with a chief complaint of SOB and Wheezing for several  Days.He has been seen in er with progressing chest wall pleuritic chest pain with sob but only after he started coughing . Using cpap at home but in affective at relieving daytime hypersomnolence  . Consulted to evaluate Respiratory status.    Past Medical History:   Diagnosis Date    Acid reflux     Anticoagulant long-term use     Eliquis for a-fib    Anxiety disorder     Atrial fibrillation     Back pain     Coronary artery disease     Diabetes     Diabetes mellitus     Diabetes mellitus, type 2     HTN (hypertension)     Hypercholesteremia     Hypertension     Migraine     Myocardial infarction     Respiratory distress     chronic cough    Sleep apnea         Past Surgical History:   Procedure Laterality Date    ANKLE SURGERY      ANKLE SURGERY Bilateral     ANTERIOR CERVICAL DISCECTOMY W/ FUSION      carpal tunnel both hands      ESOPHAGOGASTRODUODENOSCOPY N/A 9/5/2019    Procedure: EGD (ESOPHAGOGASTRODUODENOSCOPY);  Surgeon: Ciro Angulo MD;  Location: [facility];  Service: Endoscopy;  Laterality: N/A;    HERNIA REPAIR      NECK SURGERY      SHOULDER SURGERY      SHOULDER SURGERY Bilateral     UVULOPALATOPHARYNGOPLASTY      WRIST SURGERY Bilateral        Prior to Admission medications    Medication Sig Start Date End Date Taking? Authorizing Provider   albuterol (PROVENTIL) 2.5 mg /3 mL (0.083 %) nebulizer solution Take 2.5 mg by nebulization every 6 (six) hours as needed. 1/9/19  Yes Historical Provider,  MD   albuterol (PROVENTIL/VENTOLIN HFA) 90 mcg/actuation inhaler Inhale 2 puffs into the lungs every 6 (six) hours as needed for Wheezing (COUGH). 8/19/19 8/18/20 Yes Starr Orellana NP   ALPRAZolam (XANAX) 0.5 MG tablet TAKE ONE TABLET BY MOUTH EVERY DAY AS NEEDED  Patient taking differently: Take 0.5 mg by mouth nightly as needed.  9/20/19  Yes Yomi Smith MD   apixaban (ELIQUIS) 5 mg Tab Take 5 mg by mouth 2 (two) times daily.   Yes Historical Provider, MD   aspirin 81 MG Chew Take 81 mg by mouth once daily.   Yes Historical Provider, MD   carvedilol (COREG) 12.5 MG tablet TAKE ONE TABLET BY MOUTH TWICE DAILY  Patient taking differently: Take 12.5 mg by mouth 2 (two) times daily.  8/29/19  Yes Yomi Smith MD   fluticasone-vilanterol (BREO ELLIPTA) 200-25 mcg/dose DsDv diskus inhaler Inhale 1 puff into the lungs once daily. Controller 11/8/18  Yes Yomi Smith MD   furosemide (LASIX) 40 MG tablet Take 1 tablet (40 mg total) by mouth once daily. 9/26/19  Yes Rainer Sharma MD   hydrALAZINE (APRESOLINE) 50 MG tablet Take 1 tablet (50 mg total) by mouth every 12 (twelve) hours. 9/27/19 11/27/19 Yes Yomi Smith MD   HYDROcodone-acetaminophen (NORCO) 7.5-325 mg per tablet Take 1 tablet by mouth every 12 (twelve) hours as needed for Pain. 9/20/19  Yes Yomi Smith MD   metFORMIN (GLUCOPHAGE) 1000 MG tablet Take 1,000 mg by mouth 2 (two) times daily with meals.   Yes Historical Provider, MD   montelukast (SINGULAIR) 10 mg tablet TAKE ONE TABLET BY MOUTH EVERY EVENING  Patient taking differently: Take 10 mg by mouth every evening.  4/12/19  Yes Yomi Smith MD   pantoprazole (PROTONIX) 40 MG tablet TAKE ONE TABLET BY MOUTH EVERY MORNING  Patient taking differently: Take 40 mg by mouth 2 (two) times daily.  9/26/19  Yes Yomi Smith MD   pravastatin (PRAVACHOL) 40 MG tablet Take 1 tablet (40 mg total) by mouth nightly. 5/10/19  Yes Yomi Smith MD   cyclobenzaprine  (FLEXERIL) 10 MG tablet Take 1 tablet (10 mg total) by mouth every 8 (eight) hours as needed for Muscle spasms. 10/9/19   Keaton Mcallister MD   insulin glargine (LANTUS U-100 INSULIN) 100 unit/mL injection INJECT 63 UNITS UNDER SKIN TWICE DAILY  Patient taking differently: Inject 60 Units into the skin 2 (two) times daily.  9/7/19   Braulio Mckeon MD   insulin lispro (HUMALOG U-100 INSULIN) 100 unit/mL injection INJECT 30 UNITS INTO THE SKIN THREE TIMES DAILY BEFORE MEALS  Patient taking differently: Inject 30 Units into the skin 3 (three) times daily before meals.  9/12/19   Yomi Smith MD   insulin syringe-needle U-100 1 mL 31 gauge x 5/16 Syrg USE AS DIRECTED 5 times a day 11/30/18   Yomi Simth MD       Social History     Socioeconomic History    Marital status: Legally      Spouse name: Not on file    Number of children: Not on file    Years of education: Not on file    Highest education level: Not on file   Occupational History    Occupation: Disabled   Social Needs    Financial resource strain: Not on file    Food insecurity:     Worry: Not on file     Inability: Not on file    Transportation needs:     Medical: Not on file     Non-medical: Not on file   Tobacco Use    Smoking status: Never Smoker    Smokeless tobacco: Never Used   Substance and Sexual Activity    Alcohol use: Yes     Comment: seldom    Drug use: Yes     Types: Hydrocodone    Sexual activity: Not Currently   Lifestyle    Physical activity:     Days per week: Not on file     Minutes per session: Not on file    Stress: To some extent   Relationships    Social connections:     Talks on phone: Not on file     Gets together: Not on file     Attends Yazdanism service: Not on file     Active member of club or organization: Not on file     Attends meetings of clubs or organizations: Not on file     Relationship status: Not on file   Other Topics Concern    Not on file   Social History Narrative    ** Merged  "History Encounter **            Family History   Problem Relation Age of Onset    No Known Problems Mother     Cancer Father        Review of patient's allergies indicates:   Allergen Reactions    Spironolactone      gynecomatia    Allergies have been reviewed.     ROS: Review of Systems   Constitutional: Negative for chills, fever and weight loss.   HENT: Negative for sore throat.    Eyes: Negative for double vision and photophobia.   Respiratory: Positive for cough, sputum production and shortness of breath.    Cardiovascular: Positive for leg swelling (mild) and PND (occasional). Negative for palpitations.   Gastrointestinal: Negative for abdominal pain and diarrhea.   Genitourinary: Negative for dysuria and frequency.   Musculoskeletal: Positive for myalgias (generalized). Negative for back pain and neck pain.   Skin: Negative.    Neurological: Negative for dizziness, weakness and headaches.   Endo/Heme/Allergies: Does not bruise/bleed easily.   Psychiatric/Behavioral: Negative for memory loss. The patient has insomnia (intermittant ).        PE:   Vitals:    10/14/19 1744 10/14/19 1804 10/14/19 1814 10/14/19 1943   BP: (!) 149/72      BP Location: Left arm      Patient Position: Sitting      Pulse: (!) 58 62 72 63   Resp: 20   20   Temp: 97 °F (36.1 °C)      TempSrc: Oral      SpO2: 98%   97%   Weight: (!) 190 kg (418 lb 14 oz)      Height: 5' 9" (1.753 m)       Physical Exam    Alert and orientated X 3   HEENT: Head: Normocephalic no trauma                Ears : Normal Pinna No Drainage no Battles sign                Eyes: Vision Unchanged, No conjunctivitis,No drainage                Neck: Supple, No JVD,No Abnormal Carotid Pulsations                Throat: No Erythema, No pus,No Swelling,Mallampati score= 4+    Chest: poor air entry BS distant mild wheezing with L>>>R rhonchi   Cardiac: RRR S1+ S2 with a -S3: +M = 2/6, No R/H/G  Abdomen: Bowel Sounds are Normal.Soft Abdomen. No organomegaly of " Liver,Spleen,or Kidneys   CNS: Non focal and intact. Cranial nerves 2, 346,8,9,10 and 12 are normal.Norrmal gait.Normal posture.  Extremities: No Clubbing,No Cyanosis with oxygen,Positive mild edema of lower extremities Bilateral  Skin: No Rash, No Ulcerative sores,and No cellulitis of the IV site.    Lab Results   Component Value Date    WBC 7.85 10/14/2019    HGB 11.5 (L) 10/14/2019    HCT 37.3 (L) 10/14/2019     10/14/2019    CHOL 149 09/27/2018    TRIG 141 09/27/2018    HDL 31 (L) 09/27/2018    ALT 18 10/14/2019    AST 14 10/14/2019     10/14/2019    K 3.8 10/14/2019     10/14/2019    CREATININE 0.9 10/14/2019    BUN 17 10/14/2019    CO2 29 10/14/2019    INR 1.0 10/14/2019    HGBA1C 10.2 (H) 09/03/2019               Assessment/Plan:     Chest wall pain  Left sided pleuritic chest pain     Obesity hypoventilation syndrome  Severe bmi 61  Will most likely need a homeventilator since he has failed CPAP at home CO2 retainer in the 60's     Moderate persistent asthma without complication  Still wheezing will continue neb and follow     Cough  Cough may have caused this pt chest wall pain     Diastolic dysfunction with acute on chronic heart failure  Severe obesity with secondary pulmonary hypertension     Type 2 diabetes mellitus with diabetic polyneuropathy, with long-term current use of insulin  Unstable     Obstructive sleep apnea  This is more than obstructive sleep apnea OHS is more likely with severe desaturations     Chronic atrial fibrillation  Rate controled     bipap may need a home ventilator control chest wall pain and follow clinically       Thank you for your consult. I will follow-up with patient. Please contact us if you have any additional questions.     Nash Neumann MD  Pulmonology  [facility]

## 2019-10-15 NOTE — PLAN OF CARE
Room air pulse ox. fluctuates  between 76% to 87% with breathing. NP notified, Nasal O2 placed back on pt.

## 2019-10-15 NOTE — PLAN OF CARE
ABG that was drawn was not arterial. Dr. Neumann notified of the mixed venous results and was ok with that. Stated no need to redraw ABG.

## 2019-10-15 NOTE — SUBJECTIVE & OBJECTIVE
Past Medical History:   Diagnosis Date    Acid reflux     Anticoagulant long-term use     Eliquis for a-fib    Anxiety disorder     Atrial fibrillation     Back pain     Coronary artery disease     Diabetes     Diabetes mellitus     Diabetes mellitus, type 2     HTN (hypertension)     Hypercholesteremia     Hypertension     Migraine     Myocardial infarction     Respiratory distress     chronic cough    Sleep apnea        Past Surgical History:   Procedure Laterality Date    ANKLE SURGERY      ANKLE SURGERY Bilateral     ANTERIOR CERVICAL DISCECTOMY W/ FUSION      carpal tunnel both hands      ESOPHAGOGASTRODUODENOSCOPY N/A 9/5/2019    Procedure: EGD (ESOPHAGOGASTRODUODENOSCOPY);  Surgeon: Ciro Angulo MD;  [facility];  Service: Endoscopy;  Laterality: N/A;    HERNIA REPAIR      NECK SURGERY      SHOULDER SURGERY      SHOULDER SURGERY Bilateral     UVULOPALATOPHARYNGOPLASTY      WRIST SURGERY Bilateral        Review of patient's allergies indicates:   Allergen Reactions    Spironolactone      gynecomatia       No current facility-administered medications on file prior to encounter.      Current Outpatient Medications on File Prior to Encounter   Medication Sig    albuterol (PROVENTIL) 2.5 mg /3 mL (0.083 %) nebulizer solution Take 2.5 mg by nebulization every 6 (six) hours as needed.    albuterol (PROVENTIL/VENTOLIN HFA) 90 mcg/actuation inhaler Inhale 2 puffs into the lungs every 6 (six) hours as needed for Wheezing (COUGH).    ALPRAZolam (XANAX) 0.5 MG tablet TAKE ONE TABLET BY MOUTH EVERY DAY AS NEEDED (Patient taking differently: Take 0.5 mg by mouth nightly as needed. )    apixaban (ELIQUIS) 5 mg Tab Take 5 mg by mouth 2 (two) times daily.    aspirin 81 MG Chew Take 81 mg by mouth once daily.    carvedilol (COREG) 12.5 MG tablet TAKE ONE TABLET BY MOUTH TWICE DAILY (Patient taking differently: Take 12.5 mg by mouth 2 (two) times daily. )     fluticasone-vilanterol (BREO ELLIPTA) 200-25 mcg/dose DsDv diskus inhaler Inhale 1 puff into the lungs once daily. Controller    furosemide (LASIX) 40 MG tablet Take 1 tablet (40 mg total) by mouth once daily.    hydrALAZINE (APRESOLINE) 50 MG tablet Take 1 tablet (50 mg total) by mouth every 12 (twelve) hours.    HYDROcodone-acetaminophen (NORCO) 7.5-325 mg per tablet Take 1 tablet by mouth every 12 (twelve) hours as needed for Pain.    metFORMIN (GLUCOPHAGE) 1000 MG tablet Take 1,000 mg by mouth 2 (two) times daily with meals.    montelukast (SINGULAIR) 10 mg tablet TAKE ONE TABLET BY MOUTH EVERY EVENING (Patient taking differently: Take 10 mg by mouth every evening. )    pantoprazole (PROTONIX) 40 MG tablet TAKE ONE TABLET BY MOUTH EVERY MORNING (Patient taking differently: Take 40 mg by mouth 2 (two) times daily. )    pravastatin (PRAVACHOL) 40 MG tablet Take 1 tablet (40 mg total) by mouth nightly.    insulin glargine (LANTUS U-100 INSULIN) 100 unit/mL injection INJECT 63 UNITS UNDER SKIN TWICE DAILY (Patient taking differently: Inject 60 Units into the skin 2 (two) times daily. )    insulin lispro (HUMALOG U-100 INSULIN) 100 unit/mL injection INJECT 30 UNITS INTO THE SKIN THREE TIMES DAILY BEFORE MEALS (Patient taking differently: Inject 30 Units into the skin 3 (three) times daily before meals. )    insulin syringe-needle U-100 1 mL 31 gauge x 5/16 Syrg USE AS DIRECTED 5 times a day    [DISCONTINUED] cyclobenzaprine (FLEXERIL) 10 MG tablet Take 1 tablet (10 mg total) by mouth every 8 (eight) hours as needed for Muscle spasms.     Family History     Problem Relation (Age of Onset)    Cancer Father    No Known Problems Mother        Tobacco Use    Smoking status: Never Smoker    Smokeless tobacco: Never Used   Substance and Sexual Activity    Alcohol use: Yes     Comment: seldom    Drug use: Yes     Types: Hydrocodone    Sexual activity: Not Currently     Review of Systems   Constitutional:  Negative for chills and fever.   HENT: Negative for congestion, postnasal drip and sore throat.    Eyes: Negative for photophobia.   Respiratory: Negative for chest tightness and shortness of breath.    Cardiovascular: Positive for chest pain.        Left scapular area pain ;radiates to front    Gastrointestinal: Negative for abdominal distention, abdominal pain, blood in stool and vomiting.        Some swallowing dysfunction    Genitourinary: Negative for dysuria, flank pain and hematuria.   Musculoskeletal: Negative for back pain.   Skin: Negative for pallor.   Neurological: Negative for dizziness, seizures, facial asymmetry, speech difficulty and numbness.   Hematological: Does not bruise/bleed easily.   Psychiatric/Behavioral: Negative for agitation and suicidal ideas. The patient is not nervous/anxious.      Objective:     Vital Signs (Most Recent):  Temp: 97.3 °F (36.3 °C) (10/15/19 1115)  Pulse: 60 (10/15/19 1115)  Resp: 19 (10/15/19 1115)  BP: 138/61 (10/15/19 1115)  SpO2: 98 % (10/15/19 1115) Vital Signs (24h Range):  Temp:  [96.1 °F (35.6 °C)-98.8 °F (37.1 °C)] 97.3 °F (36.3 °C)  Pulse:  [46-85] 60  Resp:  [16-22] 19  SpO2:  [86 %-98 %] 98 %  BP: (138-173)/(61-91) 138/61     Weight: (!) 190 kg (418 lb 14 oz)  Body mass index is 61.86 kg/m².    Physical Exam   Constitutional: He is oriented to person, place, and time.   Morbidly obese male with  pain    HENT:   Head: Normocephalic and atraumatic.   Nose: Nose normal.   Mouth/Throat: Oropharynx is clear and moist.   Eyes: Pupils are equal, round, and reactive to light. Conjunctivae and EOM are normal.   Neck: Normal range of motion. Neck supple. No JVD present. No thyromegaly present.   Cardiovascular: Normal rate, regular rhythm, normal heart sounds and intact distal pulses.   Pulmonary/Chest: Effort normal and breath sounds normal.           Pain area ; radiation to front ; no rash ; no redness   No tenderness upon palpation    Abdominal: Soft. Bowel  sounds are normal. He exhibits no distension and no mass. There is no tenderness. There is no guarding.   Musculoskeletal: Normal range of motion. He exhibits no edema.   Lymphadenopathy:     He has no cervical adenopathy.   Neurological: He is alert and oriented to person, place, and time. He has normal reflexes. No cranial nerve deficit.   Skin: Skin is warm and dry. No rash noted. No pallor.   Psychiatric: He has a normal mood and affect.   Nursing note and vitals reviewed.        CRANIAL NERVES     CN III, IV, VI   Pupils are equal, round, and reactive to light.  Extraocular motions are normal.        Significant Labs:   A1C:   Recent Labs   Lab 04/23/19  1811 08/06/19  1552 09/03/19  1725   HGBA1C 9.6* 8.7* 10.2*     CBC:   Recent Labs   Lab 10/14/19  1252 10/15/19  0307   WBC 7.85 8.16   HGB 11.5* 10.5*   HCT 37.3* 34.9*    214     CMP:   Recent Labs   Lab 10/14/19  1252 10/15/19  0307    142   K 3.8 4.2    103   CO2 29 29   * 198*   BUN 17 18   CREATININE 0.9 1.0   CALCIUM 8.7 8.6*   PROT 6.6 6.1   ALBUMIN 3.4* 3.1*   BILITOT 0.5 0.4   ALKPHOS 61 59   AST 14 11   ALT 18 16   ANIONGAP 9 10   EGFRNONAA >60 >60     Lab Results   Component Value Date    DDIMER 0.32 10/14/2019         Troponin:   Recent Labs   Lab 10/14/19  2106 10/15/19  0307 10/15/19  0854   TROPONINI 0.122* 0.121* 0.090*     BNP  Recent Labs   Lab 10/14/19  1252   BNP 49     Significant Imaging: CXR: I have reviewed all pertinent results/findings within the past 24 hours and my personal findings are:  The heart is mildly enlarged.  There is central pulmonary vascular congestion and suspected mild edema.  No consolidation or pleural effusion.  Skeletal structures are intact.  Cervical fusion hardware is seen.  Past Medical History:   Diagnosis Date    Acid reflux     Anticoagulant long-term use     Eliquis for a-fib    Anxiety disorder     Atrial fibrillation     Back pain     Coronary artery disease     Diabetes      Diabetes mellitus     Diabetes mellitus, type 2     HTN (hypertension)     Hypercholesteremia     Hypertension     Migraine     Myocardial infarction     Respiratory distress     chronic cough    Sleep apnea        Past Surgical History:   Procedure Laterality Date    ANKLE SURGERY      ANKLE SURGERY Bilateral     ANTERIOR CERVICAL DISCECTOMY W/ FUSION      carpal tunnel both hands      ESOPHAGOGASTRODUODENOSCOPY N/A 9/5/2019    Procedure: EGD (ESOPHAGOGASTRODUODENOSCOPY);  Surgeon: Ciro Angulo MD;  Location: [facility];  Service: Endoscopy;  Laterality: N/A;    HERNIA REPAIR      NECK SURGERY      SHOULDER SURGERY      SHOULDER SURGERY Bilateral     UVULOPALATOPHARYNGOPLASTY      WRIST SURGERY Bilateral        Review of patient's allergies indicates:   Allergen Reactions    Spironolactone      gynecomatia       No current facility-administered medications on file prior to encounter.      Current Outpatient Medications on File Prior to Encounter   Medication Sig    albuterol (PROVENTIL) 2.5 mg /3 mL (0.083 %) nebulizer solution Take 2.5 mg by nebulization every 6 (six) hours as needed.    albuterol (PROVENTIL/VENTOLIN HFA) 90 mcg/actuation inhaler Inhale 2 puffs into the lungs every 6 (six) hours as needed for Wheezing (COUGH).    ALPRAZolam (XANAX) 0.5 MG tablet TAKE ONE TABLET BY MOUTH EVERY DAY AS NEEDED (Patient taking differently: Take 0.5 mg by mouth nightly as needed. )    apixaban (ELIQUIS) 5 mg Tab Take 5 mg by mouth 2 (two) times daily.    aspirin 81 MG Chew Take 81 mg by mouth once daily.    carvedilol (COREG) 12.5 MG tablet TAKE ONE TABLET BY MOUTH TWICE DAILY (Patient taking differently: Take 12.5 mg by mouth 2 (two) times daily. )    fluticasone-vilanterol (BREO ELLIPTA) 200-25 mcg/dose DsDv diskus inhaler Inhale 1 puff into the lungs once daily. Controller    furosemide (LASIX) 40 MG tablet Take 1 tablet (40 mg total) by mouth once daily.    hydrALAZINE  (APRESOLINE) 50 MG tablet Take 1 tablet (50 mg total) by mouth every 12 (twelve) hours.    HYDROcodone-acetaminophen (NORCO) 7.5-325 mg per tablet Take 1 tablet by mouth every 12 (twelve) hours as needed for Pain.    metFORMIN (GLUCOPHAGE) 1000 MG tablet Take 1,000 mg by mouth 2 (two) times daily with meals.    montelukast (SINGULAIR) 10 mg tablet TAKE ONE TABLET BY MOUTH EVERY EVENING (Patient taking differently: Take 10 mg by mouth every evening. )    pantoprazole (PROTONIX) 40 MG tablet TAKE ONE TABLET BY MOUTH EVERY MORNING (Patient taking differently: Take 40 mg by mouth 2 (two) times daily. )    pravastatin (PRAVACHOL) 40 MG tablet Take 1 tablet (40 mg total) by mouth nightly.    insulin glargine (LANTUS U-100 INSULIN) 100 unit/mL injection INJECT 63 UNITS UNDER SKIN TWICE DAILY (Patient taking differently: Inject 60 Units into the skin 2 (two) times daily. )    insulin lispro (HUMALOG U-100 INSULIN) 100 unit/mL injection INJECT 30 UNITS INTO THE SKIN THREE TIMES DAILY BEFORE MEALS (Patient taking differently: Inject 30 Units into the skin 3 (three) times daily before meals. )    insulin syringe-needle U-100 1 mL 31 gauge x 5/16 Syrg USE AS DIRECTED 5 times a day    [DISCONTINUED] cyclobenzaprine (FLEXERIL) 10 MG tablet Take 1 tablet (10 mg total) by mouth every 8 (eight) hours as needed for Muscle spasms.     Family History     Problem Relation (Age of Onset)    Cancer Father    No Known Problems Mother        Tobacco Use    Smoking status: Never Smoker    Smokeless tobacco: Never Used   Substance and Sexual Activity    Alcohol use: Yes     Comment: seldom    Drug use: Yes     Types: Hydrocodone    Sexual activity: Not Currently     Review of Systems   Constitutional: Negative for chills and fever.   HENT: Negative for congestion, postnasal drip and sore throat.    Eyes: Negative for photophobia.   Respiratory: Negative for chest tightness and shortness of breath.    Cardiovascular: Positive for  chest pain.        Left scapular area pain ;radiates to front    Gastrointestinal: Negative for abdominal distention, abdominal pain, blood in stool and vomiting.        Some swallowing dysfunction    Genitourinary: Negative for dysuria, flank pain and hematuria.   Musculoskeletal: Negative for back pain.   Skin: Negative for pallor.   Neurological: Negative for dizziness, seizures, facial asymmetry, speech difficulty and numbness.   Hematological: Does not bruise/bleed easily.   Psychiatric/Behavioral: Negative for agitation and suicidal ideas. The patient is not nervous/anxious.      Objective:     Vital Signs (Most Recent):  Temp: 97.3 °F (36.3 °C) (10/15/19 1115)  Pulse: 60 (10/15/19 1115)  Resp: 19 (10/15/19 1115)  BP: 138/61 (10/15/19 1115)  SpO2: 98 % (10/15/19 1115) Vital Signs (24h Range):  Temp:  [96.1 °F (35.6 °C)-98.8 °F (37.1 °C)] 97.3 °F (36.3 °C)  Pulse:  [46-85] 60  Resp:  [16-22] 19  SpO2:  [86 %-98 %] 98 %  BP: (138-173)/(61-91) 138/61     Weight: (!) 190 kg (418 lb 14 oz)  Body mass index is 61.86 kg/m².    Physical Exam   Constitutional: He is oriented to person, place, and time.   Morbidly obese male with  pain    HENT:   Head: Normocephalic and atraumatic.   Nose: Nose normal.   Mouth/Throat: Oropharynx is clear and moist.   Eyes: Pupils are equal, round, and reactive to light. Conjunctivae and EOM are normal.   Neck: Normal range of motion. Neck supple. No JVD present. No thyromegaly present.   Cardiovascular: Normal rate, regular rhythm, normal heart sounds and intact distal pulses.   Pulmonary/Chest: Effort normal and breath sounds normal.           Pain area ; radiation to front ; no rash ; no redness   No tenderness upon palpation    Abdominal: Soft. Bowel sounds are normal. He exhibits no distension and no mass. There is no tenderness. There is no guarding.   Musculoskeletal: Normal range of motion. He exhibits no edema.   Lymphadenopathy:     He has no cervical adenopathy.    Neurological: He is alert and oriented to person, place, and time. He has normal reflexes. No cranial nerve deficit.   Skin: Skin is warm and dry. No rash noted. No pallor.   Psychiatric: He has a normal mood and affect.   Nursing note and vitals reviewed.        CRANIAL NERVES     CN III, IV, VI   Pupils are equal, round, and reactive to light.  Extraocular motions are normal.        Significant Labs:   A1C:   Recent Labs   Lab 04/23/19  1811 08/06/19  1552 09/03/19  1725   HGBA1C 9.6* 8.7* 10.2*     CBC:   Recent Labs   Lab 10/14/19  1252 10/15/19  0307   WBC 7.85 8.16   HGB 11.5* 10.5*   HCT 37.3* 34.9*    214     CMP:   Recent Labs   Lab 10/14/19  1252 10/15/19  0307    142   K 3.8 4.2    103   CO2 29 29   * 198*   BUN 17 18   CREATININE 0.9 1.0   CALCIUM 8.7 8.6*   PROT 6.6 6.1   ALBUMIN 3.4* 3.1*   BILITOT 0.5 0.4   ALKPHOS 61 59   AST 14 11   ALT 18 16   ANIONGAP 9 10   EGFRNONAA >60 >60     Lab Results   Component Value Date    DDIMER 0.32 10/14/2019         Troponin:   Recent Labs   Lab 10/14/19  2106 10/15/19  0307 10/15/19  0854   TROPONINI 0.122* 0.121* 0.090*     BNP  Recent Labs   Lab 10/14/19  1252   BNP 49     Significant Imaging: CXR: I have reviewed all pertinent results/findings within the past 24 hours and my personal findings are:  The heart is mildly enlarged.  There is central pulmonary vascular congestion and suspected mild edema.  No consolidation or pleural effusion.  Skeletal structures are intact.  Cervical fusion hardware is seen.  Review of Systems   Constitutional: Negative for chills and fever.   HENT: Negative for congestion, postnasal drip and sore throat.    Eyes: Negative for photophobia.   Respiratory: Negative for chest tightness and shortness of breath.    Cardiovascular: Positive for chest pain.        Left scapular area pain ;radiates to front    Gastrointestinal: Negative for abdominal distention, abdominal pain, blood in stool and vomiting.         Some swallowing dysfunction    Genitourinary: Negative for dysuria, flank pain and hematuria.   Musculoskeletal: Negative for back pain.   Skin: Negative for pallor.   Neurological: Negative for dizziness, seizures, facial asymmetry, speech difficulty and numbness.   Hematological: Does not bruise/bleed easily.   Psychiatric/Behavioral: Negative for agitation and suicidal ideas. The patient is not nervous/anxious.      Objective:     Vital Signs (Most Recent):  Temp: 97 °F (36.1 °C) (10/15/19 0703)  Pulse: 68 (10/15/19 0914)  Resp: 18 (10/15/19 0914)  BP: (!) 146/70 (10/15/19 0703)  SpO2: 98 % (10/15/19 0914) Vital Signs (24h Range):  Temp:  [96.1 °F (35.6 °C)-98.8 °F (37.1 °C)] 97 °F (36.1 °C)  Pulse:  [46-85] 68  Resp:  [16-22] 18  SpO2:  [93 %-98 %] 98 %  BP: (146-173)/(64-91) 146/70     Weight: (!) 190 kg (418 lb 14 oz)  Body mass index is 61.86 kg/m².    Physical Exam   Constitutional: He is oriented to person, place, and time.   Morbidly obese male with  pain    HENT:   Head: Normocephalic and atraumatic.   Nose: Nose normal.   Mouth/Throat: Oropharynx is clear and moist.   Eyes: Pupils are equal, round, and reactive to light. Conjunctivae and EOM are normal.   Neck: Normal range of motion. Neck supple. No JVD present. No thyromegaly present.   Cardiovascular: Normal rate, regular rhythm, normal heart sounds and intact distal pulses.   Pulmonary/Chest: Effort normal and breath sounds normal.           Pain area ; radiation to front ; no rash ; no redness   No tenderness upon palpation    Abdominal: Soft. Bowel sounds are normal. He exhibits no distension and no mass. There is no tenderness. There is no guarding.   Musculoskeletal: Normal range of motion. He exhibits no edema.   Lymphadenopathy:     He has no cervical adenopathy.   Neurological: He is alert and oriented to person, place, and time. He has normal reflexes. No cranial nerve deficit.   Skin: Skin is warm and dry. No rash noted. No pallor.    Psychiatric: He has a normal mood and affect.   Nursing note and vitals reviewed.        CRANIAL NERVES     CN III, IV, VI   Pupils are equal, round, and reactive to light.  Extraocular motions are normal.        Significant Labs:   A1C:   Recent Labs   Lab 04/23/19  1811 08/06/19  1552 09/03/19  1725   HGBA1C 9.6* 8.7* 10.2*     CBC:   Recent Labs   Lab 10/14/19  1252 10/15/19  0307   WBC 7.85 8.16   HGB 11.5* 10.5*   HCT 37.3* 34.9*    214     CMP:   Recent Labs   Lab 10/14/19  1252 10/15/19  0307    142   K 3.8 4.2    103   CO2 29 29   * 198*   BUN 17 18   CREATININE 0.9 1.0   CALCIUM 8.7 8.6*   PROT 6.6 6.1   ALBUMIN 3.4* 3.1*   BILITOT 0.5 0.4   ALKPHOS 61 59   AST 14 11   ALT 18 16   ANIONGAP 9 10   EGFRNONAA >60 >60     Lab Results   Component Value Date    DDIMER 0.32 10/14/2019     Recent Labs   Lab 10/14/19  1549  10/15/19  0307   CPK 62  62  --   --    CPKMB 1.5  --   --    TROPONINI 0.186*   < > 0.121*   MB 2.4  --   --     < > = values in this interval not displayed.     Lab Results   Component Value Date    INR 1.0 10/14/2019    INR 1.0 09/13/2017    INR 1.5 (H) 11/25/2016       BNP  Recent Labs   Lab 10/14/19  1252   BNP 49     Significant Imaging:     CXR The heart is mildly enlarged.  There is central pulmonary vascular congestion and suspected mild edema.  No consolidation or pleural effusion.  Skeletal structures are intact.  Cervical fusion hardware is seen    10/14 EKG The heart is mildly enlarged.  There is central pulmonary vascular congestion and suspected mild edema.  No consolidation or pleural effusion.  Skeletal structures are intact.  Cervical fusion hardware is seen    10/15 EKG Sinus bradycardia with 1st degree A-V block  Low voltage QRS  Septal infarct ,age undetermined  Abnormal ECG  When compared with ECG of 14-OCT-2019 12:38,  Nonspecific T wave abnormality has replaced inverted T waves in Inferior leads

## 2019-10-15 NOTE — PLAN OF CARE
10/15/19 1018   Advance Directives (For Healthcare)   Advance Directive  (If Adv Dir status is received, view document under Adv Dir in header or Chart Review Media tab) Patient does not have Advance Directive, declines information.       Patient wishes to remain a Full Code.     Susannah Humphrey LMSW

## 2019-10-15 NOTE — PROGRESS NOTES
Home Oxygen Evaluation    Date Performed: 10/15/2019    1) Patient's O2 Sat on room air, while at rest: 86%        If O2 sats on room air at rest are 88% or below, patient qualifies. No additional testing needed. Document N/A in steps 2 and 3. If 89% or above, complete steps 2.      2) Patient's O2 Sat on room air while exercising: N/A        If O2 sats on room air while exercising remain 89% or above patient does not qualify, no further testing needed Document N/A in step 3. If O2 sats on room air while exercising are 88% or below, continue to step 3.      3) Patient's O2 Sat while exercising on O2: N/A at N/A LPM         (Must show improvement from #2 for patients to qualify)    If O2 sats improve on oxygen, patient qualifies for portable oxygen. If not, the patient does not qualify.

## 2019-10-15 NOTE — NURSING
Patient discharged.  NADN.  IV d/c'd.  Catheter tip intact.  Pressure dressing applied.  Verbal and written discharge instructions and follow up appointments given.  Patient verbalized understanding.  Patient transported to [facility] via wheelchair escorted by transport staff.

## 2019-10-15 NOTE — PROGRESS NOTES
[facility]  Pulmonology  Progress Note    Patient Name: Jean Chao  MRN: [id number]  Admission Date: 10/14/2019  Hospital Length of Stay: 0 days  Code Status: Full Code  Attending Provider: Nura Randhawa MD  Primary Care Provider: Yomi Smith MD   Principal Problem: <principal problem not specified>    Subjective:     Interval History: feels much better on trilogy    Objective:     Vital Signs (Most Recent):  Temp: 97 °F (36.1 °C) (10/15/19 0703)  Pulse: 68 (10/15/19 0914)  Resp: 18 (10/15/19 0914)  BP: (!) 146/70 (10/15/19 0703)  SpO2: 98 % (10/15/19 0914) Vital Signs (24h Range):  Temp:  [96.1 °F (35.6 °C)-98.8 °F (37.1 °C)] 97 °F (36.1 °C)  Pulse:  [46-85] 68  Resp:  [16-22] 18  SpO2:  [93 %-98 %] 98 %  BP: (146-173)/(64-91) 146/70     Weight: (!) 190 kg (418 lb 14 oz)  Body mass index is 61.86 kg/m².      Intake/Output Summary (Last 24 hours) at 10/15/2019 0921  Last data filed at 10/14/2019 1800  Gross per 24 hour   Intake 240 ml   Output --   Net 240 ml       Physical Exam   Constitutional: He is oriented to person, place, and time. He appears well-developed and well-nourished. He is cooperative.  Non-toxic appearance. He does not appear ill. No distress.   HENT:   Head: Normocephalic and atraumatic.   Right Ear: Hearing, tympanic membrane, external ear and ear canal normal.   Left Ear: Hearing, tympanic membrane, external ear and ear canal normal.   Nose: Nose normal. No mucosal edema, rhinorrhea or nasal deformity. No epistaxis. Right sinus exhibits no maxillary sinus tenderness and no frontal sinus tenderness. Left sinus exhibits no maxillary sinus tenderness and no frontal sinus tenderness.   Mouth/Throat: Uvula is midline, oropharynx is clear and moist and mucous membranes are normal. No trismus in the jaw. Normal dentition. No uvula swelling. No posterior oropharyngeal erythema.   Eyes: Conjunctivae and lids are normal. No scleral icterus.   Sclera clear bilat   Neck:  Trachea normal, full passive range of motion without pain and phonation normal. Neck supple.   Cardiovascular: Normal rate, regular rhythm, normal heart sounds, intact distal pulses and normal pulses.   Pulmonary/Chest: No accessory muscle usage. He is in respiratory distress (mild to moderate). He has decreased breath sounds in the right upper field, the right middle field, the right lower field, the left upper field, the left middle field and the left lower field. He has wheezes in the right middle field, the right lower field, the left middle field and the left lower field. He has rhonchi in the right middle field, the right lower field, the left middle field and the left lower field.           Abdominal: Soft. Normal appearance and bowel sounds are normal. He exhibits no distension. There is no tenderness.   Musculoskeletal: Normal range of motion. He exhibits no edema or deformity.   Neurological: He is alert and oriented to person, place, and time. He exhibits normal muscle tone. Coordination normal.   Skin: Skin is warm, dry and intact. He is not diaphoretic. No pallor.   Psychiatric: He has a normal mood and affect. His speech is normal and behavior is normal. Judgment and thought content normal. Cognition and memory are normal.   Nursing note and vitals reviewed.      Vents:  Oxygen Concentration (%): 35 (10/15/19 0703)    Lines/Drains/Airways     Peripheral Intravenous Line                 Peripheral IV - Single Lumen 10/14/19 1509 20 G Right Antecubital less than 1 day                Significant Labs:    CBC/Anemia Profile:  Recent Labs   Lab 10/14/19  1252 10/15/19  0307   WBC 7.85 8.16   HGB 11.5* 10.5*   HCT 37.3* 34.9*    214   MCV 84 86   RDW 14.8* 14.7*        Chemistries:  Recent Labs   Lab 10/14/19  1252 10/15/19  0307    142   K 3.8 4.2    103   CO2 29 29   BUN 17 18   CREATININE 0.9 1.0   CALCIUM 8.7 8.6*   ALBUMIN 3.4* 3.1*   PROT 6.6 6.1   BILITOT 0.5 0.4   ALKPHOS 61 59    ALT 18 16   AST 14 11       All pertinent labs within the past 24 hours have been reviewed.    Significant Imaging:  I have reviewed all pertinent imaging results/findings within the past 24 hours.    Assessment/Plan:     Chronic respiratory failure  Evidenced by normal PH with CO2 in the 60's  Patient requiring noninvasive home ventilator due to Chronic Respiratory Failure and COPD. Patient severely desaturates their PO2 during Sleep or while relaxing. Patient needs Non Invasive Ventilator day and night in order to decrease Work of Breathing and improve Oxygenation by recruitment and distension of the alveoli and terminal Bronchioles. Although patient's condition has improved since admit on BiPap, patient remains symptomatic on BiPap. Patient is compliant with home respiratory regimen but remains symptomatic. Patient could die at home without Non Invasive Ventilator or have frequent/increased admissions to hospital. AVAPS-AE mode of therapy is required for proper ventilatory support and chronic symptom management.     COPD with acute exacerbation  Will need beta agonist and ipitrop    Chest wall pain  Slightly improved  Left sided pleuritic chest pain     Obesity hypoventilation syndrome  Needs trilogy  Patient requiring noninvasive home ventilator due to Chronic Respiratory Failure and COPD. Patient severely desaturates their PO2 during Sleep or while relaxing. Patient needs Non Invasive Ventilator day and night in order to decrease Work of Breathing and improve Oxygenation by recruitment and distension of the alveoli and terminal Bronchioles. Although patient's condition has improved since admit on BiPap, patient remains symptomatic on BiPap. Patient is compliant with home respiratory regimen but remains symptomatic. Patient could die at home without Non Invasive Ventilator or have frequent/increased admissions to hospital. AVAPS-AE mode of therapy is required for proper ventilatory support and chronic symptom  management.   Severe bmi 61  Will most likely need a homeventilator since he has failed CPAP at home CO2 retainer in the 60's     Moderate persistent asthma without complication  Still wheezing will continue neb and follow     Diastolic dysfunction with acute on chronic heart failure  Severe obesity with secondary pulmonary hypertension from nocturnal and daytime hypoxia cpap failed     Chronic atrial fibrillation  Rate controled     Cough-resolved as of 10/15/2019  Cough may have caused this pt chest wall pain       Patient requiring noninvasive home ventilator due to Chronic Respiratory Failure and COPD. Patient severely desaturates their PO2 during Sleep or while relaxing. Patient needs Non Invasive Ventilator day and night in order to decrease Work of Breathing and improve Oxygenation by recruitment and distension of the alveoli and terminal Bronchioles. Although patient's condition has improved since admit on BiPap, patient remains symptomatic on BiPap. Patient is compliant with home respiratory regimen but remains symptomatic. Patient could die at home without Non Invasive Ventilator or have frequent/increased admissions to hospital. AVAPS-AE mode of therapy is required for proper ventilatory support and chronic symptom management.      Nash Neumann MD  Pulmonology  [facility]

## 2019-10-15 NOTE — PLAN OF CARE
10/15/19 1147   Post-Acute Status   Post-Acute Authorization HME   HME Status Family Barriers   Discharge Delays None known at this time       MAXIME spoke with Margy at [facility] who informed that patient states to her that he cannot afford the price per month for the oxygen and trilogy. The total is $225 per month. Margy states that she can attempt to qualify the patient for financial assistance with upper management. Margy will fax MAXIME a document for the patient to complete for the financial assistance program. Margy will also need a copy of the patient's income for verification. MAXIME to assist as needed.     Susannah Humphrey LMSW

## 2019-10-15 NOTE — PLAN OF CARE
10/15/19 1014   Discharge Assessment   Assessment Type Discharge Planning Assessment   Confirmed/corrected address and phone number on facesheet? Yes   Assessment information obtained from? Patient   Prior to hospitilization cognitive status: Alert/Oriented   Prior to hospitalization functional status: Assistive Equipment   Current cognitive status: Unable to Assess  (Patient unable to open eyes during assessment but answered questions. )   Current Functional Status: Assistive Equipment   Facility Arrived From: Home   Lives With child(gurinder), adult  (2 daughters.)   Able to Return to Prior Arrangements yes   Is patient able to care for self after discharge? Yes   Who are your caregiver(s) and their phone number(s)? Rupa Chao (Daughter) [phone]   Patient's perception of discharge disposition home or selfcare   Readmission Within the Last 30 Days no previous admission in last 30 days   Patient currently being followed by outpatient case management? No   Patient currently receives any other outside agency services? No   Equipment Currently Used at Home CPAP;glucometer   Do you have any problems affording any of your prescribed medications? No   Discharge Plan A Home   Discharge Plan B Home with family   DME Needed Upon Discharge  ventilator;oxygen   Patient/Family in Agreement with Plan yes       Patient needing home oxygen and a trilogy for home use upon discharge. SW to remain available for discharge needs.     Susannah Humphrey, SIMIN

## 2019-10-15 NOTE — DISCHARGE SUMMARY
[facility] Hospital Medicine  Discharge Summary      Patient Name: Jaen Chao  MRN: [id number]  Admission Date: 10/14/2019  Hospital Length of Stay: 0 days  Discharge Date and Time:  10/15/2019 11:33 AM  Attending Physician: Nura Randhawa MD   Discharging Provider: Ligia Mata NP  Primary Care Provider: Yomi Smith MD      HPI:    Patient is a 59 year old male   who has his PCP /cardiology /pulmonolgy at [facility]. Dr Sanders, Dr Jett, ERICK Carrolluma . He has a past medical history of  Morbid obesity , Chronic asthma , JENNIFER on cpap , paroxysmal atrial fibrillation, T2DM on insulin, GERD, hypertension,and chronic diastolic HF, and chronic cough who presents to the ER with complaints of a left sided back pain that radiates to his lateral chest wall that began last night.   Pain left scapular region ; 10/10 intensity , radiates to left lower breat region   Worse with deep inspiration   He was seen in the ER several days prior with similar complaints. He states that the pain worsens with movement, coughing and deep inspiration. The pain appears to occur in cycles and worsens in intensity at times. EKG with no acute ischemic changes. Chest x ray normal sized heart, cardiac enzymes with mild troponin elevation .16 and normal total CK and CK-MB. Renal function stable.  His two troponins are mildly elevated . Seen by [facility] in ER . Placed in observation for rule out MI .  EKG   Sinus rhythm with 1st degree A-V block  Vertical axis  Low voltage QRS  Abnormal ECG  When compared with ECG of 23.            * No surgery found *      Hospital Course:   Pt was placed in observation to R/O MI. VSS/afebrile. Troponin minimally elevated but declining. Dr Fuchs has cleared him. No chest pain. Still c/o back pain that has been persistent for days. No rash noted. Did have some desats with sleeping in ER. Has CPAP at home and Dr Neumann has seen him here. Ordering trilogy for home use. POX 77-86% on RA resting      Consults:   Consults (From admission, onward)        Status Ordering Provider     Inpatient consult to Cardiology-[facility]  Once     Provider:  Geo Fuchs MD    Acknowledged BERTHA DAVIS     Inpatient consult to Pulmonology  Once     Provider:  Nash Neumann MD    Acknowledged LATANYA COVARRUBIAS          * Chest wall pain  No rash ;   No hyperemia   Chest pain looks like muscular   Resume norco/ flexaril which he takes at home    D dimer was ok. POX >90% on RA, CXR clear  Still reports pain with moving or deep breathing.   Takes norco at home for pain and last had refill 9/29  Also takes bnenzo. He reports that he usually takes soma at home but out. Has not filled since 9/2. Usually gets rx from Dr Tucker his PCP. Denies flexeril has helped here. Also on indomethicin TID    Obesity hypoventilation syndrome    # The patient is asked to make an attempt to improve diet and exercise patterns to aid in medical management of this problem.     # Eat  5 small meals a day.     # Cut out high carbohydrate  foods : bread, rice, pasta, potatoes.     # Exercise/walk 5x/week for at least 30-45  minutes.            Moderate persistent asthma without complication  Cont advair  Nebs every 4hr      Essential (primary) hypertension    Resume coreg, lasix, hydralazine  bp 146//81    Diastolic dysfunction with acute on chronic heart failure  BNP normal   Resume lasix         Type 2 diabetes mellitus with diabetic polyneuropathy, with long-term current use of insulin  accu check  -258  Correction scale  Hold metformin     Resume home routine on d/c      Obstructive sleep apnea  He uses cpap at home   Dr neumann ordering trilogy for home  Does not need for d/c      Dyslipidemia  Resume statin       Chronic atrial fibrillation  Sinus rhythm with 1 degree   On eliquis and coreg   He 49-68    Coronary artery disease involving native coronary artery of native heart without angina pectoris    Resume asa,  "pravastatin    Cough-resolved as of 10/15/2019          Final Active Diagnoses:    Diagnosis Date Noted POA    PRINCIPAL PROBLEM:  Chest wall pain [R07.89] 10/14/2019 Yes    COPD with acute exacerbation [J44.1] 10/15/2019 Yes    Chronic respiratory failure [J96.10] 10/15/2019 Yes    Obesity hypoventilation syndrome [E66.2] 09/06/2019 Yes    Moderate persistent asthma without complication [J45.40] 04/23/2019 Yes     Chronic    Dyslipidemia [E78.5] 04/10/2017 Yes     Chronic    Essential (primary) hypertension [I10] 04/10/2017 Yes     Chronic    Obstructive sleep apnea [G47.33] 04/10/2017 Yes     Chronic    Type 2 diabetes mellitus with diabetic polyneuropathy, with long-term current use of insulin [E11.42, Z79.4] 04/10/2017 Not Applicable     Chronic    Diastolic dysfunction with acute on chronic heart failure [I50.33] 04/10/2017 Yes     Chronic    Coronary artery disease involving native coronary artery of native heart without angina pectoris [I25.10] 04/10/2017 Yes    Chronic atrial fibrillation [I48.20] 04/10/2017 Yes     Chronic      Problems Resolved During this Admission:    Diagnosis Date Noted Date Resolved POA    Cough [R05] 04/18/2018 10/15/2019 Yes       Discharged Condition: good    Disposition: Home or Self Care    Follow Up:  Follow-up Information     Yomi Smith MD In 3 days.    Specialty:  Internal Medicine  Contact information:  [address] [address]  [phone]                 Patient Instructions:      VENTILATOR FOR HOME USE     Order Specific Question Answer Comments   Height: 5' 9" (1.753 m)    Weight: 190 kg (418 lb 14 oz)    Does patient have medical equipment at home? none    Length of need (1-99 months): 99    Interface needed: Full face mask    Mode: AVAPS    Rate: 15    Tidal Volume 700    PEEP - EPAP 7.0 CM/H2O    Pressure support - IPAP 25    Humidifier needed? No      OXYGEN FOR HOME USE     Order Specific Question Answer Comments   Liter Flow 2  " "  Duration Continuous    Qualifying SpO2: 87    Testing done at: Rest    Route nasal cannula    Portable mode: continuous    Device home concentrator with portable unit    Length of need (in months): 99 mos    Patient condition with qualifying saturation other chronic pulmonary condition    Height: 5' 9" (1.753 m)    Weight: 190 kg (418 lb 14 oz)    Does patient have medical equipment at home? none    Alternative treatment measures have been tried or considered and deemed clinically ineffective. Yes        Significant Diagnostic Studies:     Significant Labs:   A1C:   Recent Labs   Lab 04/23/19  1811 08/06/19  1552 09/03/19  1725   HGBA1C 9.6* 8.7* 10.2*     CBC:   Recent Labs   Lab 10/14/19  1252 10/15/19  0307   WBC 7.85 8.16   HGB 11.5* 10.5*   HCT 37.3* 34.9*    214     CMP:   Recent Labs   Lab 10/14/19  1252 10/15/19  0307    142   K 3.8 4.2    103   CO2 29 29   * 198*   BUN 17 18   CREATININE 0.9 1.0   CALCIUM 8.7 8.6*   PROT 6.6 6.1   ALBUMIN 3.4* 3.1*   BILITOT 0.5 0.4   ALKPHOS 61 59   AST 14 11   ALT 18 16   ANIONGAP 9 10   EGFRNONAA >60 >60     Lab Results   Component Value Date    DDIMER 0.32 10/14/2019     Recent Labs   Lab 10/14/19  1549  10/15/19  0307   CPK 62  62  --   --    CPKMB 1.5  --   --    TROPONINI 0.186*   < > 0.121*   MB 2.4  --   --     < > = values in this interval not displayed.     Lab Results   Component Value Date    INR 1.0 10/14/2019    INR 1.0 09/13/2017    INR 1.5 (H) 11/25/2016       BNP  Recent Labs   Lab 10/14/19  1252   BNP 49     Significant Imaging:     CXR The heart is mildly enlarged.  There is central pulmonary vascular congestion and suspected mild edema.  No consolidation or pleural effusion.  Skeletal structures are intact.  Cervical fusion hardware is seen    10/14 EKG The heart is mildly enlarged.  There is central pulmonary vascular congestion and suspected mild edema.  No consolidation or pleural effusion.  Skeletal structures are " intact.  Cervical fusion hardware is seen    10/15 EKG Sinus bradycardia with 1st degree A-V block  Low voltage QRS  Septal infarct ,age undetermined  Abnormal ECG  When compared with ECG of 14-OCT-2019 12:38,  Nonspecific T wave abnormality has replaced inverted T waves in Inferior leads      Pending Diagnostic Studies:     Procedure Component Value Units Date/Time    Troponin I [[id number]]     Order Status:  Sent Lab Status:  No result     Specimen:  Blood          Medications:  Reconciled Home Medications:      Medication List      CHANGE how you take these medications    ALPRAZolam 0.5 MG tablet  Commonly known as:  XANAX  TAKE ONE TABLET BY MOUTH EVERY DAY AS NEEDED  What changed:    · how much to take  · how to take this  · when to take this  · reasons to take this  · additional instructions     insulin glargine 100 unit/mL injection  Commonly known as:  LANTUS U-100 INSULIN  INJECT 63 UNITS UNDER SKIN TWICE DAILY  What changed:    · how much to take  · how to take this  · when to take this  · additional instructions     insulin lispro 100 unit/mL injection  Commonly known as:  HumaLOG U-100 Insulin  INJECT 30 UNITS INTO THE SKIN THREE TIMES DAILY BEFORE MEALS  What changed:    · how much to take  · how to take this  · when to take this  · additional instructions     pantoprazole 40 MG tablet  Commonly known as:  PROTONIX  TAKE ONE TABLET BY MOUTH EVERY MORNING  What changed:  when to take this        CONTINUE taking these medications    * albuterol 2.5 mg /3 mL (0.083 %) nebulizer solution  Commonly known as:  PROVENTIL  Take 2.5 mg by nebulization every 6 (six) hours as needed.     * albuterol 90 mcg/actuation inhaler  Commonly known as:  PROVENTIL/VENTOLIN HFA  Inhale 2 puffs into the lungs every 6 (six) hours as needed for Wheezing (COUGH).     aspirin 81 MG Chew  Take 81 mg by mouth once daily.     carvedilol 12.5 MG tablet  Commonly known as:  COREG  TAKE ONE TABLET BY MOUTH TWICE DAILY     ELIQUIS 5 mg  Tab  Generic drug:  apixaban  Take 5 mg by mouth 2 (two) times daily.     fluticasone furoate-vilanterol 200-25 mcg/dose Dsdv diskus inhaler  Commonly known as:  BREO ELLIPTA  Inhale 1 puff into the lungs once daily. Controller     furosemide 40 MG tablet  Commonly known as:  LASIX  Take 1 tablet (40 mg total) by mouth once daily.     hydrALAZINE 50 MG tablet  Commonly known as:  APRESOLINE  Take 1 tablet (50 mg total) by mouth every 12 (twelve) hours.     HYDROcodone-acetaminophen 7.5-325 mg per tablet  Commonly known as:  NORCO  Take 1 tablet by mouth every 12 (twelve) hours as needed for Pain.     insulin syringe-needle U-100 1 mL 31 gauge x 5/16 Syrg  USE AS DIRECTED 5 times a day     metFORMIN 1000 MG tablet  Commonly known as:  GLUCOPHAGE  Take 1,000 mg by mouth 2 (two) times daily with meals.     montelukast 10 mg tablet  Commonly known as:  SINGULAIR  TAKE ONE TABLET BY MOUTH EVERY EVENING     pravastatin 40 MG tablet  Commonly known as:  PRAVACHOL  Take 1 tablet (40 mg total) by mouth nightly.         * This list has 2 medication(s) that are the same as other medications prescribed for you. Read the directions carefully, and ask your doctor or other care provider to review them with you.            STOP taking these medications    cyclobenzaprine 10 MG tablet  Commonly known as:  FLEXERIL            Indwelling Lines/Drains at time of discharge:   Lines/Drains/Airways     None                 Time spent on the discharge of patient: 20 minutes  Patient was seen and examined on the date of discharge and determined to be suitable for discharge.         Ligia Mata NP  Department of Hospital Medicine  [facility]

## 2019-10-15 NOTE — PROGRESS NOTES
[facility]  Cardiology  Progress Note    Patient Name: Jean Chao  MRN: [id number]  Admission Date: 10/14/2019  Hospital Length of Stay: 0 days  Code Status: Full Code   Attending Physician: Nura Randhawa MD   Primary Care Physician: Yomi Smith MD  Expected Discharge Date:   Principal Problem:<principal problem not specified>    Subjective:     Hospital Course: Patient is a 59 year old male with a past medical history of paroxysmal atrial fibrillation, T2DM on insulin, JENNIFER, GERD, hypertension, obesity, and chronic diastolic HF, and chronic cough who presents to the ER with complaints of a left sided back pain that radiates to his lateral chest wall that began last night. He was seen in the ER several days prior with similar complaints. He states that the pain worsens with movement, coughing and deep inspiration. The pain appears to occur in cycles and worsens in intensity at times. EKG with no acute ischemic changes. Chest x ray normal sized heart, cardiac enzymes with mild troponin elevation .16 and normal total CK and CK-MB. Renal function stable. CIS asked to evaluate.     Interval History: Troponin trending down at .12, BP/HR stable, EKG this morning sinus bradycardia with 1st degree AV block, no acute ischemic changes.     ROS     Constitutional : Negative  EENT : Negative  CV : Negative  Respiratory : Negative  Gastrointestinal: Negative   Genitourinary: Negative  Musculoskeletal: Back and chest wall pain  Skin : Negative  Neurological : AAO x 3     Objective:     Vital Signs (Most Recent):  Temp: 97 °F (36.1 °C) (10/15/19 0703)  Pulse: (!) 46 (10/15/19 0703)  Resp: 20 (10/15/19 0703)  BP: (!) 146/70 (10/15/19 0703)  SpO2: 96 % (10/15/19 0703) Vital Signs (24h Range):  Temp:  [96.1 °F (35.6 °C)-98.8 °F (37.1 °C)] 97 °F (36.1 °C)  Pulse:  [46-82] 46  Resp:  [16-22] 20  SpO2:  [93 %-98 %] 96 %  BP: (146-173)/(64-91) 146/70     Weight: (!) 190 kg (418 lb 14 oz)  Body mass index is  61.86 kg/m².    SpO2: 96 %  O2 Device (Oxygen Therapy): BiPAP      Intake/Output Summary (Last 24 hours) at 10/15/2019 0824  Last data filed at 10/14/2019 1800  Gross per 24 hour   Intake 240 ml   Output --   Net 240 ml       Lines/Drains/Airways     Peripheral Intravenous Line                 Peripheral IV - Single Lumen 10/14/19 1509 20 G Right Antecubital less than 1 day                Physical Exam     General appearance: alert, appears stated age and cooperative  Head: Normocephalic, without obvious abnormality, atraumatic  Eyes: conjunctivae/corneas clear. PERRL  Neck: no carotid bruit, no JVD and supple, symmetrical, trachea midline  Lungs: clear to auscultation bilaterally, normal respiratory effort  Chest Wall: no tenderness  Heart: regular rate and rhythm, S1, S2 normal, no murmur, click, rub or gallop  Abdomen: soft, non-tender; bowel sounds normal; no masses,  no organomegaly  Extremities: Extremities normal, atraumatic, no cyanosis, clubbing, or edema  Pulses: Dorsalis Pedis R: 2+ (normal)/L: 2+ (normal)  Skin: Skin color, texture, turgor normal. No rashes or lesions  Neurologic: Normal mood and affect  Alert and oriented X 3    Significant Labs:   BMP:   Recent Labs   Lab 10/14/19  1252 10/15/19  0307   * 198*    142   K 3.8 4.2    103   CO2 29 29   BUN 17 18   CREATININE 0.9 1.0   CALCIUM 8.7 8.6*   , CMP   Recent Labs   Lab 10/14/19  1252 10/15/19  0307    142   K 3.8 4.2    103   CO2 29 29   * 198*   BUN 17 18   CREATININE 0.9 1.0   CALCIUM 8.7 8.6*   PROT 6.6 6.1   ALBUMIN 3.4* 3.1*   BILITOT 0.5 0.4   ALKPHOS 61 59   AST 14 11   ALT 18 16   ANIONGAP 9 10   ESTGFRAFRICA >60 >60   EGFRNONAA >60 >60   , CBC   Recent Labs   Lab 10/14/19  1252 10/15/19  0307   WBC 7.85 8.16   HGB 11.5* 10.5*   HCT 37.3* 34.9*    214    and Troponin   Recent Labs   Lab 10/14/19  1549 10/14/19  2106 10/15/19  0307   TROPONINI 0.186* 0.122* 0.121*       Assessment and Plan:        Active Diagnoses:    Diagnosis Date Noted POA    Chest wall pain [R07.89] 10/14/2019 Yes    Obesity hypoventilation syndrome [E66.2] 09/06/2019 Yes    Moderate persistent asthma without complication [J45.40] 04/23/2019 Yes     Chronic    Cough [R05] 04/18/2018 Yes    Dyslipidemia [E78.5] 04/10/2017 Yes     Chronic    Essential (primary) hypertension [I10] 04/10/2017 Yes     Chronic    Obstructive sleep apnea [G47.33] 04/10/2017 Yes     Chronic    Type 2 diabetes mellitus with diabetic polyneuropathy, with long-term current use of insulin [E11.42, Z79.4] 04/10/2017 Not Applicable     Chronic    Diastolic dysfunction with acute on chronic heart failure [I50.33] 04/10/2017 Yes     Chronic    Coronary artery disease involving native coronary artery of native heart without angina pectoris [I25.10] 04/10/2017 Yes    Chronic atrial fibrillation [I48.20] 04/10/2017 Yes     Chronic      Problems Resolved During this Admission:       VTE Risk Mitigation (From admission, onward)         Ordered     apixaban tablet 5 mg  2 times daily      10/14/19 1807     IP VTE HIGH RISK PATIENT  Once      10/14/19 1744     Place sequential compression device  Until discontinued      10/14/19 1744               Current Facility-Administered Medications   Medication    acetaminophen tablet 650 mg    albuterol-ipratropium 2.5 mg-0.5 mg/3 mL nebulizer solution 3 mL    ALPRAZolam tablet 0.5 mg    apixaban tablet 5 mg    aspirin chewable tablet 81 mg    carvedilol tablet 12.5 mg    cyclobenzaprine tablet 10 mg    dextrose 10% (D10W) Bolus    dextrose 10% (D10W) Bolus    fluticasone furoate-vilanterol 200-25 mcg/dose diskus inhaler 1 puff    furosemide tablet 40 mg    hydrALAZINE tablet 50 mg    HYDROcodone-acetaminophen 5-325 mg per tablet 1 tablet    HYDROcodone-acetaminophen 7.5-325 mg per tablet 1 tablet    indomethacin capsule 50 mg    insulin aspart U-100 pen 10 Units    insulin detemir U-100 pen 30 Units     ondansetron injection 4 mg    pantoprazole EC tablet 40 mg    pravastatin tablet 40 mg    promethazine (PHENERGAN) 12.5 mg in dextrose 5 % 50 mL IVPB    sodium chloride 0.9% flush 10 mL          Dx: Mostly c/o L Back pain, unlikely to be cardiac at this point    Chest wall pain atypical and appears non-cardiac in nature, pain worsens with deep inspiration, movement and coughing, somewhat reproducible with palpation, troponin .16 with normal total CK and CK-MB, EKG with no acute ischemic changes. Troponin trended down to .12.  C July 2016 normal coronaries,  Echocardiogram March 2019 EF 65%, grade II diastolic dysfunction, PA pressure 19 mmHg, mild SC, MR, AR, TR      Chronic cough 2 years, states he has an upcoming evaluation for possible esophagitis      Chronic diastolic heart failure euvolemic on exam, BNP normal     Paroxysmal atrial fibrillation on Eliquis 5 mg bid for stroke prophylaxis     Hypertension BP elevated today, could be pain related      T2DM on insulin      Obesity     JENNIFER     Hypercholesterolemia         Plan: continue  Asa, apixaban, coreg, lasix, hydralazine and statin  Consider DC home with outpatient stress test      Lien Chi NP for Dr. Fuchs  Cardiology  [facility]    I attest that I have personally seen and examined this patient. I have reviewed and discussed the management in detail as outlined above.

## 2019-10-16 ENCOUNTER — TELEPHONE (OUTPATIENT)
Dept: GASTROENTEROLOGY | Facility: CLINIC | Age: 59
End: 2019-10-16

## 2019-10-16 NOTE — TELEPHONE ENCOUNTER
----- Message from Karime Raphael RN sent at 10/16/2019 10:02 AM CDT -----  Contact: Self       ----- Message -----  From: Erica Palacios  Sent: 10/16/2019   9:53 AM CDT  To: [facility] Staff    Returning a call from yesterday.     [phone]

## 2019-10-16 NOTE — TELEPHONE ENCOUNTER
Spoke w pt. Pt ready to schedule. Informed pt that we are booked through Dec. With the referrals that have been waiting for Jan schedule to open, his apt will most likely be in Feb.

## 2019-10-18 PROBLEM — S13.4XXA WHIPLASH INJURIES: Status: ACTIVE | Noted: 2019-10-18

## 2019-10-18 PROBLEM — S80.212A ABRASION, KNEE, LEFT, INITIAL ENCOUNTER: Status: ACTIVE | Noted: 2019-10-18

## 2019-10-18 PROBLEM — I25.10 CORONARY ARTERY DISEASE INVOLVING NATIVE CORONARY ARTERY OF NATIVE HEART WITHOUT ANGINA PECTORIS: Chronic | Status: ACTIVE | Noted: 2017-04-10

## 2019-10-23 ENCOUNTER — TELEPHONE (OUTPATIENT)
Dept: GASTROENTEROLOGY | Facility: CLINIC | Age: 59
End: 2019-10-23

## 2019-10-23 NOTE — TELEPHONE ENCOUNTER
New pt appt scheduled in our Wednesday clinic on 10/30/19 w Mary to expedite apt and later follow up w .

## 2019-10-30 ENCOUNTER — TELEPHONE (OUTPATIENT)
Dept: GASTROENTEROLOGY | Facility: CLINIC | Age: 59
End: 2019-10-30

## 2019-10-30 ENCOUNTER — OFFICE VISIT (OUTPATIENT)
Dept: GASTROENTEROLOGY | Facility: CLINIC | Age: 59
End: 2019-10-30
Payer: MEDICARE

## 2019-10-30 VITALS
WEIGHT: 315 LBS | SYSTOLIC BLOOD PRESSURE: 128 MMHG | BODY MASS INDEX: 46.65 KG/M2 | DIASTOLIC BLOOD PRESSURE: 68 MMHG | HEART RATE: 73 BPM | HEIGHT: 69 IN

## 2019-10-30 DIAGNOSIS — R13.19 ESOPHAGEAL DYSPHAGIA: Primary | ICD-10-CM

## 2019-10-30 DIAGNOSIS — K21.9 GASTROESOPHAGEAL REFLUX DISEASE, ESOPHAGITIS PRESENCE NOT SPECIFIED: ICD-10-CM

## 2019-10-30 DIAGNOSIS — R07.9 CHEST PAIN, UNSPECIFIED TYPE: ICD-10-CM

## 2019-10-30 DIAGNOSIS — R93.3 ABNORMAL ESOPHAGRAM: ICD-10-CM

## 2019-10-30 PROCEDURE — 99215 OFFICE O/P EST HI 40 MIN: CPT | Mod: PBBFAC | Performed by: NURSE PRACTITIONER

## 2019-10-30 PROCEDURE — 99215 OFFICE O/P EST HI 40 MIN: CPT | Mod: S$PBB,,, | Performed by: NURSE PRACTITIONER

## 2019-10-30 PROCEDURE — 99215 PR OFFICE/OUTPT VISIT, EST, LEVL V, 40-54 MIN: ICD-10-PCS | Mod: S$PBB,,, | Performed by: NURSE PRACTITIONER

## 2019-10-30 PROCEDURE — 99999 PR PBB SHADOW E&M-EST. PATIENT-LVL V: CPT | Mod: PBBFAC,,, | Performed by: NURSE PRACTITIONER

## 2019-10-30 PROCEDURE — 99999 PR PBB SHADOW E&M-EST. PATIENT-LVL V: ICD-10-PCS | Mod: PBBFAC,,, | Performed by: NURSE PRACTITIONER

## 2019-10-30 NOTE — LETTER
October 30, 2019      Ciro Angulo MD  [address]  [address]  [place] [address]           [facility] - Gastroenterology  [address]  [facility] [address]  Phone: [phone]  Fax: [phone]          Patient: Jean Chao   MR Number: [id number]   YOB: 1960   Date of Visit: 10/30/2019       Dear Dr. Ciro Angulo:    Thank you for referring Jean Chao to me for evaluation. Attached you will find relevant portions of my assessment and plan of care.    If you have questions, please do not hesitate to call me. I look forward to following Jean Chao along with you.    Sincerely,    Patricia Guerra, SANDRA    Enclosure  CC:  No Recipients    If you would like to receive this communication electronically, please contact [email or web address] or [phone] to request more information on [employer] access.    For providers and/or their staff who would like to refer a patient to [facility], please contact us through our one-stop-shop provider referral line, [facility] , at [phone].    If you feel you have received this communication in error or would no longer like to receive these types of communications, please e-mail [email or web address]

## 2019-10-30 NOTE — PROGRESS NOTES
[facility] Consultation Note    Reason for Consult:    Chief Complaint   Patient presents with    Heartburn    Chest Pain    Dysphagia     trouble swallowing    Abdominal Pain    Gas     bloating         PCP:   Yomi Smith   [address] / [facility] [address]    Referring MD:  Ciro Angulo Md  [address]  [place] [address]      HPI:  Jean Chao is a 59 y.o. male with a past medical history of anemia, asthma, anxiety, MDD, HLD, HTN, DM 2, AFib, migraine, JENNIFER, chronic back pain referred to motility clinic for second opinion regarding the following problems:    GERD.  Patient reports bothersome heartburn  Onset:few yrs ago  Retrosternal pyrosis:yes  Regurgitation:yes  Belching:yes  Frequency: sometimes daily. Other times few days weekly  Improve with:     No improvement with protonix 40 mg twice daily. No improvement with nexium BID. Cannot recall if he has tried prilosec, dexilant, aciphex, prevacid.   Upright symptoms: yes  Nocturnal symptoms:  yes  Hoarseness:sometimes  Cough:yes  Throat clearing:yes  Food Triggers:none noticed  Caffeine intake:no  Sleeps with head of the bed elevated.   Avoids eating prior to bedtime.      Chest pain.  Sharp pain, ache. RU chest. Not associated with exertion. Not associated with meals/swallowing.  Onset: few yrs ago  Frequency: sometimes daily, sometime few days weekly       Triggers (cold fluids, caffeine, smoking, ETOH):none noticed  Consumes mostly soft foods and liquids:regular diet  Negative cardiac workup:  Yes. EKG, ECHO, angiogram negative, stress test scheduled for next month.     Has not tried nifedipine   diltiazem   isosorrbide dinitrate   peppermint oil   sildenafil   trazodone  Botox    Dysphagia.  Reports difficulty swallowing.  Onset of symptoms:few yrs ago  Problems with solids:yes  Problems with liquids:no  Choking while eating:yes   Coughing while eating: yes  Location: upper esophagus    Frequency:  Couple days weekly  Improves with:nothing in particular, sometimes has to spit materials back up for relief  Narcotic pain meds:norco   History of food impactions requiring ED visit:no  History of allergies:medications  History of seasonal allergies:no  History of food allergies:no  History of eczema:no    Achalasia Eckardt Score  Dysphagia  (none (0), occasional (1), daily (2), with every meal (3)): 1  Regurgitation (none (0), occasional (1), daily (2), with every meal (3)): 1  Chest pain (none (0), occasional (1), daily (2), several times per day (3)): 2  Weight loss (kg) (0 (0), <5 (1), 5-10 (2), >10 (3)): 0  Total Eckardt Score(the final score is the sum of four components (0-12)):  4/12    Anemia. Low HGB, normal Iron, normal ferritin. Not on iron.    Anxiety. Depression. On xanax 0.5 mg daily per pcp. Has not never seen psych    JENNIFER. Uses biPAP nightly.     Chronic back pain. On norco 7.5 mg BID x few yrs per pcp.     Abdominal pain. LUQ. Onset: few yrs ago. Sometimes worse with meals.    Gas and bloating. With distention. Onset: few years ago. Worse with meals. Consumes lactose. And diet coke.     Denies nausea, vomiting, early satiety, diarrhea, constipation, BRBPR, melena, weight loss,  insomnia.       Total visit time was 40 minutes, more than 50% of which was spent in face-to-face counseling with patient regarding symptoms, diagnostic results, prognosis, risks and benefits of treatment options, instructions for management, importance of compliance with chosen treatment options, risk factor reduction, stress reduction, coping strategies.      Previous Studies:   EGD 9/5/19: nl esophagus. Gastritis. Nl duodenum.   Chest xray 10/14/19:The heart is mildly enlarged.  There is central pulmonary vascular congestion and suspected mild edema.  No consolidation or pleural effusion.  Skeletal structures are intact.  Cervical fusion hardware is seen.  CT chest 9/4/19:No CT evidence of active chest  disease.  Upper GI without KUB 08/20/2019:  Limited study due to large body size with poor clearing of the esophagus with peristalsis and no gross abnormality of the stomach. To and fro flow of contrast into the esophagus.  Upper GI with KUB 4/26/18:  Normal.  Modified barium swallow study 11/23/2016:No evidence of penetration or aspiration. Speech path note not available in epic.   Per pt, colonoscopy 7 years ago: normal. Rpt 10 yrs.    ROS:  ROS   Constitutional: No fevers, no chills, no night sweats, no weight loss  ENT: + congestion, no rhinorrhea, no chronic sinus problems  CV: + chest pain, no palpitations  Pulm: + cough, + shortness of breath  Ophtho: No blurry vision, no eye redness  GI: see HPI  Derm: No rash  Heme: No lymphadenopathy, no bruising  MSK: + joint pain, no joint swelling, no Raynauds  : No dysuria, no frequent urination, no blood in urine  Endo: No hot or cold intolerance  Neuro: + dizziness, no syncope, no seizure  Psych: + anxiety, + depression        Medical History:   Past Medical History:   Diagnosis Date    Acid reflux     Anticoagulant long-term use     Eliquis for a-fib    Anxiety disorder     Atrial fibrillation     Back pain     Coronary artery disease     Diabetes     Diabetes mellitus     Diabetes mellitus, type 2     HTN (hypertension)     Hypercholesteremia     Hypertension     Migraine     Myocardial infarction     JENNIFER treated with BiPAP     Oxygen dependent     prn    Respiratory distress     chronic cough    Sleep apnea         Surgical History:   Past Surgical History:   Procedure Laterality Date    ANKLE SURGERY      ANKLE SURGERY Bilateral     ANTERIOR CERVICAL DISCECTOMY W/ FUSION      carpal tunnel both hands      ESOPHAGOGASTRODUODENOSCOPY N/A 9/5/2019    Procedure: EGD (ESOPHAGOGASTRODUODENOSCOPY);  Surgeon: Ciro Angulo MD;  [facility];  Service: Endoscopy;  Laterality: N/A;    HERNIA REPAIR      NECK SURGERY      SHOULDER  SURGERY      SHOULDER SURGERY Bilateral     UVULOPALATOPHARYNGOPLASTY      WRIST SURGERY Bilateral         Family History:   Family History   Problem Relation Age of Onset    No Known Problems Mother     Cancer Father     Colon cancer Neg Hx     Esophageal cancer Neg Hx     Rectal cancer Neg Hx     Stomach cancer Neg Hx     Ulcerative colitis Neg Hx     Crohn's disease Neg Hx     Celiac disease Neg Hx         Social History:   Social History     Socioeconomic History    Marital status: Legally      Spouse name: Not on file    Number of children: Not on file    Years of education: Not on file    Highest education level: Not on file   Occupational History    Occupation: Disabled   Social Needs    Financial resource strain: Not on file    Food insecurity:     Worry: Not on file     Inability: Not on file    Transportation needs:     Medical: Not on file     Non-medical: Not on file   Tobacco Use    Smoking status: Never Smoker    Smokeless tobacco: Never Used   Substance and Sexual Activity    Alcohol use: Yes     Comment: seldom    Drug use: Yes     Types: Hydrocodone    Sexual activity: Not Currently   Lifestyle    Physical activity:     Days per week: Not on file     Minutes per session: Not on file    Stress: Very much   Relationships    Social connections:     Talks on phone: Not on file     Gets together: Not on file     Attends Oriental orthodox service: Not on file     Active member of club or organization: Not on file     Attends meetings of clubs or organizations: Not on file     Relationship status: Not on file   Other Topics Concern    Not on file   Social History Narrative    ** Merged History Encounter **             Review of patient's allergies indicates:   Allergen Reactions    Spironolactone      gynecomatia       Current Outpatient Medications   Medication Sig Dispense Refill    albuterol (PROVENTIL) 2.5 mg /3 mL (0.083 %) nebulizer solution Take 2.5 mg by nebulization  every 6 (six) hours as needed.  5    albuterol (PROVENTIL/VENTOLIN HFA) 90 mcg/actuation inhaler Inhale 2 puffs into the lungs every 6 (six) hours as needed for Wheezing (COUGH). 18 g 0    ALPRAZolam (XANAX) 0.5 MG tablet TAKE ONE TABLET BY MOUTH EVERY DAY AS NEEDED 30 tablet 0    apixaban (ELIQUIS) 5 mg Tab Take 5 mg by mouth 2 (two) times daily.      aspirin 81 MG Chew Take 81 mg by mouth once daily.      carvedilol (COREG) 12.5 MG tablet TAKE ONE TABLET BY MOUTH TWICE DAILY (Patient taking differently: Take 12.5 mg by mouth 2 (two) times daily. ) 60 tablet 5    fluticasone-vilanterol (BREO ELLIPTA) 200-25 mcg/dose DsDv diskus inhaler Inhale 1 puff into the lungs once daily. Controller 1 each 5    furosemide (LASIX) 40 MG tablet Take 1 tablet (40 mg total) by mouth once daily. 30 tablet 3    hydrALAZINE (APRESOLINE) 50 MG tablet Take 1 tablet (50 mg total) by mouth every 12 (twelve) hours. 60 tablet 5    HYDROcodone-acetaminophen (NORCO) 7.5-325 mg per tablet Take 1 tablet by mouth every 12 (twelve) hours as needed for Pain. 60 tablet 0    insulin glargine (LANTUS U-100 INSULIN) 100 unit/mL injection INJECT 63 UNITS UNDER SKIN TWICE DAILY (Patient taking differently: Inject 60 Units into the skin 2 (two) times daily. ) 40 mL 5    insulin lispro (HUMALOG U-100 INSULIN) 100 unit/mL injection INJECT 30 UNITS INTO THE SKIN THREE TIMES DAILY BEFORE MEALS (Patient taking differently: Inject 30 Units into the skin 3 (three) times daily before meals. ) 30 mL 5    insulin syringe-needle U-100 1 mL 31 gauge x 5/16 Syrg USE AS DIRECTED 5 times a day 3 each 5    LANTUS U-100 INSULIN 100 unit/mL injection INJECT 60 UNITS UNDER SKIN TWICE DAILY 40 mL 5    metFORMIN (GLUCOPHAGE) 1000 MG tablet Take 1,000 mg by mouth 2 (two) times daily with meals.      metFORMIN (GLUCOPHAGE) 1000 MG tablet TAKE ONE TABLET BY MOUTH TWICE DAILY 60 tablet 5    montelukast (SINGULAIR) 10 mg tablet Take 1 tablet (10 mg total) by mouth  "every evening. 30 tablet 5    orphenadrine (NORFLEX) 100 mg tablet Take 1 tablet (100 mg total) by mouth 2 (two) times daily. for 7 days 14 tablet 0    pantoprazole (PROTONIX) 40 MG tablet TAKE ONE TABLET BY MOUTH EVERY MORNING (Patient taking differently: Take 40 mg by mouth 2 (two) times daily. ) 30 tablet 5    pravastatin (PRAVACHOL) 40 MG tablet Take 1 tablet (40 mg total) by mouth nightly. 90 tablet 3    silver sulfADIAZINE 1% (SILVADENE) 1 % cream Apply topically 2 (two) times daily. 50 g 0     No current facility-administered medications for this visit.         Objective Findings:  Vital Signs:  /68   Pulse 73   Ht 5' 9" (1.753 m)   Wt (!) 183.6 kg (404 lb 12.2 oz)   BMI 59.77 kg/m²   Body mass index is 59.77 kg/m².    Physical Exam:  General appearance: alert, cooperative, no distress  HENT: Normocephalic, atraumatic, neck symmetrical, no nasal discharge, Lips, mucosa, and tongue normal; teeth and gums normal  Eyes: conjunctivae/corneas clear, EOM's intact  Lungs: CTA bilaterally in anterior and posterior fields, no wheezes, no crackles.  Heart: Regular rate and rhythm, S1, S2 normal, no murmurs heard  Abdomen: oft, non tender, non distended with positive bowel sounds in all four quadrants. No hepatosplenomegaly, ascites, or mass  Extremities: extremities symmetric; no clubbing, cyanosis, or edema  Integument: Skin color, texture, turgor normal; no rashes; hair distrubution normal  Neurologic: Alert and oriented X 3, normal strength, normal coordination and gait  Psychiatric: no pressured speech; normal affect; no evidence of impaired cognition    Labs:  Lab Results   Component Value Date    WBC 8.16 10/15/2019    HGB 10.5 (L) 10/15/2019    HCT 34.9 (L) 10/15/2019    MCV 86 10/15/2019     10/15/2019     No results found for: FERRITIN  Lab Results   Component Value Date     10/15/2019    K 4.2 10/15/2019     10/15/2019    CO2 29 10/15/2019     (H) 10/15/2019    BUN 18 " 10/15/2019    CREATININE 1.0 10/15/2019    CALCIUM 8.6 (L) 10/15/2019    PROT 6.1 10/15/2019    ALBUMIN 3.1 (L) 10/15/2019    BILITOT 0.4 10/15/2019    ALKPHOS 59 10/15/2019    AST 11 10/15/2019    ALT 16 10/15/2019     No results found for: TSH  Lab Results   Component Value Date    SEDRATE 35 (H) 09/13/2017     Lab Results   Component Value Date    CRP 18.7 (H) 09/13/2017     Lab Results   Component Value Date    HGBA1C 10.2 (H) 09/03/2019       Lab Results   Component Value Date    IRON 80 11/15/2018    TIBC 289 11/15/2018         Assessment and Plan:  Jean Chao is a 59 y.o. male with a past medical history of anemia, asthma, anxiety, MDD, HLD, HTN, DM 2, AFib, migraine, JENNIFER, chronic back pain referred to motility clinic for second opinion regarding the following problems:    GERD.    No improvement with nexium BID  No improvement with protonix 40 mg twice daily  Cannot recall if he has tried prilosec, dexilant, aciphex, prevacid.   -Cont protonix 40 mg BID  -Add OTC gaviscon w alginate PRN  -EGD w eoe bx and endoFlip. High risk case due BMI over 50. 2nd floor case.  -Consider pH testing    Chest pain. Followed by cardiology for A.fib, h/o MI:previous EKG, ECHO, angiogram negative, stress test scheduled for next month.   -Cont PPI BID  -EGD  -Esophageal manometry   -Timed barium swallow with 13 mm barium tablet.   -Has not tried nifedipine, diltiazem, isosorrbide dinitrate, peppermint oil, sildenafil, trazodone, Botox    Dysphagia.  Previously esophagram with poor clearing of the esophagus with peristalsis and to and fro flow of contrast into the esophagus. Previous MBSS with no penetration or aspiration (full report not viewable in epic). Achalasia Eckardt score of 4/12.   On Norco  -EGD w endo flip  -Esophageal manometry  -TBS w 13 mm BT    Abdominal pain. LUQ. Onset: few yrs ago. Sometimes worse with meals.  -Defer to referring GI provider    Gas and bloating. With distention. Onset: few years ago. Worse  with meals. Consumes lactose. And diet coke.   -Defer to referring GI provider.    Anemia. Low HGB, normal Iron, normal ferritin. Not on iron.  -Defer to referring GI provider    Anxiety. Depression. On xanax 0.5 mg daily per pcp. Has not never seen psych    JENNIFER. Uses biPAP nightly.     Chronic back pain. On norco 7.5 mg BID x few yrs per pcp.     Follow up in about 3 months (around 1/30/2020) for Motility w Dr. Emerson.    Pt advised that Dr. Emerson and I act as a consult service and do not accept patients to be their primary GI providers. Discussed that the goal of our visit is to address relevant motility problems while deferring other GI problems as well as screening and surveillance to his primary GI provider.   Discussed that he needs to continue to follow with his local primary GI provider.  Discussed that we will complete his/her workup, clarify diagnosis and attempt to optimize his/her symptoms with intention of him/her returning to referring GI provider for long term GI care.       1. Esophageal dysphagia    2. Gastroesophageal reflux disease, esophagitis presence not specified    3. Chest pain, unspecified type    4. Abnormal esophagram    5. BMI 50.0-59.9, adult          Order summary:  Orders Placed This Encounter    FL Esophagram With Barium Tablet    Case request GI: EGD (ESOPHAGOGASTRODUODENOSCOPY)    Case request GI: MANOMETRY, ESOPHAGUS, WITH IMPEDANCE MEASUREMENT           Thank you so much for allowing me to participate in the care of Jean Guerra, APRN, FNP-C

## 2019-10-30 NOTE — TELEPHONE ENCOUNTER
MOTILITY CLINIC PROCEDURE ORDERS    CLEARANCE FOR PROCEDURES:  Not needed     PROCEDURES  EGD with EndoFlip   Esophageal manometry with impedance - dysphagia     FLOOR:    2nd Floor    Reason for 2nd Floor:   Gastroparesis    BMI>50      PREP  Standard Prep      MEDICATIONS       Motility Studies (esophageal manometry/anorectal manometry)  Hold Narcotics x 1 days   Hold TCA x 1 days  Propofol only. No fentanyl or benzodiazepine during sedation. If additional sedation needed, discuss with Dr. Emerson.    ORDER OF TESTING:  Day 1: EGD, then manometry  Day 2:esophagram  Day 3:  Day 4:   Day 5:   1 week after:  2 weeks after:   4 weeks after:   No special requirements - pt lives locally

## 2019-10-30 NOTE — PATIENT INSTRUCTIONS
For acid reflux: continue protonix 40 mg twice daily.  Also take over the counter gaviscon with algintate 1-4 times daily as needed for reflux. Take this in addition to your routine reflux medications. You may have to order it online (ie amazon.com).    I have ordered an EGD, esophageal manometry,  esophagram for further evaluation.

## 2019-11-04 ENCOUNTER — TELEPHONE (OUTPATIENT)
Dept: ENDOSCOPY | Facility: HOSPITAL | Age: 59
End: 2019-11-04

## 2019-11-05 ENCOUNTER — TELEPHONE (OUTPATIENT)
Dept: ENDOSCOPY | Facility: HOSPITAL | Age: 59
End: 2019-11-05

## 2019-11-05 NOTE — TELEPHONE ENCOUNTER
Patient called and provided his Cardiologist name and contact information. Cardiac Clearance and approval to hold Eliquis prior to procedure requested from Dr. TUNG Coon, [phone], [phone]. Request also faxed to the [facility] at [phone].

## 2019-11-06 ENCOUNTER — TELEPHONE (OUTPATIENT)
Dept: ENDOSCOPY | Facility: HOSPITAL | Age: 59
End: 2019-11-06

## 2019-11-06 DIAGNOSIS — R13.19 ESOPHAGEAL DYSPHAGIA: Primary | ICD-10-CM

## 2019-11-06 NOTE — TELEPHONE ENCOUNTER
Patient is scheduled for EGD procedure on 1/23/20 at 0800 with Dr. Emerson on 2nd floor Endoscopy Unit and Esophageal Manometry on 1/27/20 at 0800 on 4 h floor Endoscopy Unit.

## 2019-11-07 ENCOUNTER — TELEPHONE (OUTPATIENT)
Dept: GASTROENTEROLOGY | Facility: CLINIC | Age: 59
End: 2019-11-07

## 2019-11-07 NOTE — TELEPHONE ENCOUNTER
----- Message from Karime Raphael RN sent at 11/6/2019  4:49 PM CST -----  Contact: pt       ----- Message -----  From: Milena Ray  Sent: 11/6/2019   3:55 PM CST  To: Ki Pickens-- pt is returning your call. Call back # [phone]

## 2019-12-09 PROBLEM — J96.11 CHRONIC RESPIRATORY FAILURE WITH HYPOXIA AND HYPERCAPNIA: Status: ACTIVE | Noted: 2019-10-15

## 2019-12-09 PROBLEM — J96.12 CHRONIC RESPIRATORY FAILURE WITH HYPOXIA AND HYPERCAPNIA: Chronic | Status: ACTIVE | Noted: 2019-10-15

## 2019-12-09 PROBLEM — J96.11 CHRONIC RESPIRATORY FAILURE WITH HYPOXIA AND HYPERCAPNIA: Chronic | Status: ACTIVE | Noted: 2019-10-15

## 2019-12-09 PROBLEM — J96.12 CHRONIC RESPIRATORY FAILURE WITH HYPOXIA AND HYPERCAPNIA: Status: ACTIVE | Noted: 2019-10-15

## 2019-12-23 PROBLEM — E66.01 MORBID OBESITY DUE TO EXCESS CALORIES: Status: ACTIVE | Noted: 2019-12-23

## 2019-12-24 PROBLEM — J44.1 COPD WITH ACUTE EXACERBATION: Status: RESOLVED | Noted: 2019-10-15 | Resolved: 2019-12-24

## 2020-01-14 PROBLEM — J45.51 SEVERE PERSISTENT ASTHMA WITH ACUTE EXACERBATION: Status: ACTIVE | Noted: 2020-01-14

## 2020-01-14 PROBLEM — Z91.119 LIMITED ADHERENCE TO NUTRITIONAL RECOMMENDATIONS: Status: ACTIVE | Noted: 2020-01-14

## 2020-01-18 PROBLEM — R06.02 SOB (SHORTNESS OF BREATH): Status: RESOLVED | Noted: 2019-09-24 | Resolved: 2020-01-18

## 2020-01-21 PROBLEM — S80.212A ABRASION, KNEE, LEFT, INITIAL ENCOUNTER: Status: RESOLVED | Noted: 2019-10-18 | Resolved: 2020-01-21

## 2020-01-21 PROBLEM — S13.4XXA WHIPLASH INJURIES: Status: RESOLVED | Noted: 2019-10-18 | Resolved: 2020-01-21

## 2020-01-23 ENCOUNTER — ANESTHESIA (OUTPATIENT)
Dept: ENDOSCOPY | Facility: HOSPITAL | Age: 60
End: 2020-01-23
Payer: MEDICARE

## 2020-01-23 ENCOUNTER — HOSPITAL ENCOUNTER (OUTPATIENT)
Dept: RADIOLOGY | Facility: HOSPITAL | Age: 60
Discharge: HOME | End: 2020-01-23
Attending: NURSE PRACTITIONER
Payer: MEDICARE

## 2020-01-23 ENCOUNTER — ANESTHESIA EVENT (OUTPATIENT)
Dept: ENDOSCOPY | Facility: HOSPITAL | Age: 60
End: 2020-01-23
Payer: MEDICARE

## 2020-01-23 ENCOUNTER — HOSPITAL ENCOUNTER (OUTPATIENT)
Facility: HOSPITAL | Age: 60
Discharge: HOME | End: 2020-01-23
Attending: INTERNAL MEDICINE | Admitting: INTERNAL MEDICINE
Payer: MEDICARE

## 2020-01-23 VITALS
BODY MASS INDEX: 47.74 KG/M2 | HEART RATE: 67 BPM | TEMPERATURE: 99 F | OXYGEN SATURATION: 87 % | WEIGHT: 315 LBS | SYSTOLIC BLOOD PRESSURE: 123 MMHG | DIASTOLIC BLOOD PRESSURE: 59 MMHG | HEIGHT: 68 IN | RESPIRATION RATE: 20 BRPM

## 2020-01-23 DIAGNOSIS — R93.3 ABNORMAL ESOPHAGRAM: ICD-10-CM

## 2020-01-23 DIAGNOSIS — R13.10 DYSPHAGIA, UNSPECIFIED TYPE: Primary | ICD-10-CM

## 2020-01-23 DIAGNOSIS — R13.19 ESOPHAGEAL DYSPHAGIA: ICD-10-CM

## 2020-01-23 DIAGNOSIS — R07.9 CHEST PAIN, UNSPECIFIED TYPE: ICD-10-CM

## 2020-01-23 DIAGNOSIS — R13.10 DYSPHAGIA: ICD-10-CM

## 2020-01-23 LAB
POCT GLUCOSE: 175 MG/DL (ref 70–110)
POCT GLUCOSE: 183 MG/DL (ref 70–110)

## 2020-01-23 PROCEDURE — 82962 GLUCOSE BLOOD TEST: CPT | Performed by: INTERNAL MEDICINE

## 2020-01-23 PROCEDURE — C1773 RET DEV, INSERTABLE: HCPCS | Performed by: INTERNAL MEDICINE

## 2020-01-23 PROCEDURE — 74220 X-RAY XM ESOPHAGUS 1CNTRST: CPT | Mod: TC

## 2020-01-23 PROCEDURE — 43239 PR EGD, FLEX, W/BIOPSY, SGL/MULTI: ICD-10-PCS | Mod: 51,,, | Performed by: INTERNAL MEDICINE

## 2020-01-23 PROCEDURE — 88342 CHG IMMUNOCYTOCHEMISTRY: ICD-10-PCS | Mod: 26,,, | Performed by: PATHOLOGY

## 2020-01-23 PROCEDURE — D9220A PRA ANESTHESIA: Mod: CRNA,,, | Performed by: NURSE ANESTHETIST, CERTIFIED REGISTERED

## 2020-01-23 PROCEDURE — 25000003 PHARM REV CODE 250: Performed by: NURSE ANESTHETIST, CERTIFIED REGISTERED

## 2020-01-23 PROCEDURE — 91040 ESOPH BALLOON DISTENSION TST: CPT | Mod: 26,,, | Performed by: INTERNAL MEDICINE

## 2020-01-23 PROCEDURE — 88342 IMHCHEM/IMCYTCHM 1ST ANTB: CPT | Performed by: PATHOLOGY

## 2020-01-23 PROCEDURE — 37000008 HC ANESTHESIA 1ST 15 MINUTES: Performed by: INTERNAL MEDICINE

## 2020-01-23 PROCEDURE — D9220A PRA ANESTHESIA: ICD-10-PCS | Mod: CRNA,,, | Performed by: NURSE ANESTHETIST, CERTIFIED REGISTERED

## 2020-01-23 PROCEDURE — 63600175 PHARM REV CODE 636 W HCPCS: Performed by: NURSE ANESTHETIST, CERTIFIED REGISTERED

## 2020-01-23 PROCEDURE — 91040 ESOPH BALLOON DISTENSION TST: CPT | Performed by: INTERNAL MEDICINE

## 2020-01-23 PROCEDURE — 91040 PR ESOPH BALLOON DISTENSION TST: ICD-10-PCS | Mod: 26,,, | Performed by: INTERNAL MEDICINE

## 2020-01-23 PROCEDURE — 88312 SPECIAL STAINS GROUP 1: CPT | Mod: 26,,, | Performed by: PATHOLOGY

## 2020-01-23 PROCEDURE — 88312 SPECIAL STAINS GROUP 1: CPT | Mod: 59 | Performed by: PATHOLOGY

## 2020-01-23 PROCEDURE — 88312 PR  SPECIAL STAINS,GROUP I: ICD-10-PCS | Mod: 26,,, | Performed by: PATHOLOGY

## 2020-01-23 PROCEDURE — D9220A PRA ANESTHESIA: Mod: ANES,,, | Performed by: ANESTHESIOLOGY

## 2020-01-23 PROCEDURE — 88305 TISSUE EXAM BY PATHOLOGIST: CPT | Performed by: PATHOLOGY

## 2020-01-23 PROCEDURE — 74220 X-RAY XM ESOPHAGUS 1CNTRST: CPT | Mod: 26,,, | Performed by: RADIOLOGY

## 2020-01-23 PROCEDURE — 37000009 HC ANESTHESIA EA ADD 15 MINS: Performed by: INTERNAL MEDICINE

## 2020-01-23 PROCEDURE — D9220A PRA ANESTHESIA: ICD-10-PCS | Mod: ANES,,, | Performed by: ANESTHESIOLOGY

## 2020-01-23 PROCEDURE — 43239 EGD BIOPSY SINGLE/MULTIPLE: CPT | Mod: 51,,, | Performed by: INTERNAL MEDICINE

## 2020-01-23 PROCEDURE — 88342 IMHCHEM/IMCYTCHM 1ST ANTB: CPT | Mod: 26,,, | Performed by: PATHOLOGY

## 2020-01-23 PROCEDURE — 88305 TISSUE EXAM BY PATHOLOGIST: ICD-10-PCS | Mod: 26,,, | Performed by: PATHOLOGY

## 2020-01-23 PROCEDURE — 74220 FL ESOPHAGRAM WITH BARIUM TABLET: ICD-10-PCS | Mod: 26,,, | Performed by: RADIOLOGY

## 2020-01-23 PROCEDURE — 63600175 PHARM REV CODE 636 W HCPCS: Performed by: INTERNAL MEDICINE

## 2020-01-23 PROCEDURE — 43239 EGD BIOPSY SINGLE/MULTIPLE: CPT | Performed by: INTERNAL MEDICINE

## 2020-01-23 PROCEDURE — 88305 TISSUE EXAM BY PATHOLOGIST: CPT | Mod: 26,,, | Performed by: PATHOLOGY

## 2020-01-23 PROCEDURE — C1726 CATH, BAL DIL, NON-VASCULAR: HCPCS | Performed by: INTERNAL MEDICINE

## 2020-01-23 RX ORDER — SODIUM CHLORIDE 0.9 % (FLUSH) 0.9 %
3 SYRINGE (ML) INJECTION
Status: DISCONTINUED | OUTPATIENT
Start: 2020-01-23 | End: 2020-01-23 | Stop reason: HOSPADM

## 2020-01-23 RX ORDER — PROPOFOL 10 MG/ML
VIAL (ML) INTRAVENOUS CONTINUOUS PRN
Status: DISCONTINUED | OUTPATIENT
Start: 2020-01-23 | End: 2020-01-23

## 2020-01-23 RX ORDER — PROPOFOL 10 MG/ML
VIAL (ML) INTRAVENOUS
Status: DISCONTINUED | OUTPATIENT
Start: 2020-01-23 | End: 2020-01-23

## 2020-01-23 RX ORDER — SODIUM CHLORIDE 0.9 % (FLUSH) 0.9 %
10 SYRINGE (ML) INJECTION
Status: CANCELLED | OUTPATIENT
Start: 2020-01-23

## 2020-01-23 RX ORDER — SODIUM CHLORIDE 9 MG/ML
INJECTION, SOLUTION INTRAVENOUS CONTINUOUS
Status: DISCONTINUED | OUTPATIENT
Start: 2020-01-23 | End: 2020-01-23 | Stop reason: HOSPADM

## 2020-01-23 RX ORDER — LIDOCAINE HCL/PF 100 MG/5ML
SYRINGE (ML) INTRAVENOUS
Status: DISCONTINUED | OUTPATIENT
Start: 2020-01-23 | End: 2020-01-23

## 2020-01-23 RX ORDER — LIDOCAINE HYDROCHLORIDE 20 MG/ML
SOLUTION OROPHARYNGEAL
Status: DISCONTINUED | OUTPATIENT
Start: 2020-01-23 | End: 2020-01-23

## 2020-01-23 RX ADMIN — PROPOFOL 50 MG: 10 INJECTION, EMULSION INTRAVENOUS at 08:01

## 2020-01-23 RX ADMIN — LIDOCAINE HYDROCHLORIDE 80 MG: 20 INJECTION, SOLUTION INTRAVENOUS at 08:01

## 2020-01-23 RX ADMIN — PROPOFOL 30 MG: 10 INJECTION, EMULSION INTRAVENOUS at 08:01

## 2020-01-23 RX ADMIN — LIDOCAINE HYDROCHLORIDE 15 ML: 20 SOLUTION ORAL; TOPICAL at 08:01

## 2020-01-23 RX ADMIN — PROPOFOL 150 MCG/KG/MIN: 10 INJECTION, EMULSION INTRAVENOUS at 08:01

## 2020-01-23 RX ADMIN — PROPOFOL 100 MG: 10 INJECTION, EMULSION INTRAVENOUS at 08:01

## 2020-01-23 RX ADMIN — PROPOFOL 20 MG: 10 INJECTION, EMULSION INTRAVENOUS at 08:01

## 2020-01-23 RX ADMIN — SODIUM CHLORIDE: 0.9 INJECTION, SOLUTION INTRAVENOUS at 07:01

## 2020-01-23 NOTE — H&P
Short Stay Endoscopy History and Physical    PCP - Yomi Smith MD     Procedure - EGD  ASA - per anesthesia  Mallampati - per anesthesia  History of Anesthesia problems - no  Family history Anesthesia problems -  no   Plan of anesthesia - General    HPI:  This is a 59 y.o. male here for evaluation of dysphagia, chest oain, gerd, abd pain:      ROS:  Constitutional: No fevers, chills, No weight loss  CV: No chest pain  Pulm: No cough, No shortness of breath  Ophtho: No vision changes  GI: see HPI  Derm: No rash    Medical History:  has a past medical history of Acid reflux, Anticoagulant long-term use, Anxiety disorder, Atrial fibrillation, Back pain, Coronary artery disease, Diabetes, Diabetes mellitus, Diabetes mellitus, type 2, HTN (hypertension), Hypercholesteremia, Hypertension, Migraine, Myocardial infarction, JENNIFER treated with BiPAP, Oxygen dependent, Respiratory distress, and Sleep apnea.    Surgical History:  has a past surgical history that includes Neck surgery; Shoulder surgery; carpal tunnel both hands; Ankle surgery; Hernia repair; Shoulder surgery (Bilateral); Wrist surgery (Bilateral); Ankle surgery (Bilateral); Anterior cervical discectomy w/ fusion; Uvulopalatopharyngoplasty; and Esophagogastroduodenoscopy (N/A, 9/5/2019).    Family History: family history includes Cancer in his father; No Known Problems in his mother.. Otherwise no colon cancer, inflammatory bowel disease, or GI malignancies.    Social History:  reports that he has never smoked. He has never used smokeless tobacco. He reports that he drinks alcohol. He reports that he has current or past drug history. Drug: Hydrocodone.    Review of patient's allergies indicates:   Allergen Reactions    Spironolactone      gynecomatia       Medications:   Medications Prior to Admission   Medication Sig Dispense Refill Last Dose    albuterol (PROVENTIL/VENTOLIN HFA) 90 mcg/actuation inhaler Inhale 2 puffs into the lungs every 6 (six) hours as  needed for Wheezing (COUGH). 18 g 0 Past Week at Unknown time    ALPRAZolam (XANAX) 0.5 MG tablet Take 1 tablet (0.5 mg total) by mouth daily as needed. 30 tablet 0 Past Week at Unknown time    amLODIPine (NORVASC) 5 MG tablet Take 5 mg by mouth every morning.  5 1/22/2020 at Unknown time    aspirin 81 MG Chew Take 81 mg by mouth once daily.   Past Week at Unknown time    BREO ELLIPTA 200-25 mcg/dose DsDv diskus inhaler Inhale 1 puff into the lungs once daily. Controller 1 each 5 Past Week at Unknown time    carvedilol (COREG) 12.5 MG tablet TAKE ONE TABLET BY MOUTH TWICE DAILY (Patient taking differently: Take 12.5 mg by mouth 2 (two) times daily. ) 60 tablet 5 1/22/2020 at Unknown time    furosemide (LASIX) 40 MG tablet Take 1 tablet (40 mg total) by mouth once daily. 30 tablet 3 1/22/2020 at Unknown time    hydrALAZINE (APRESOLINE) 50 MG tablet    1/22/2020 at Unknown time    HYDROcodone-acetaminophen (NORCO) 7.5-325 mg per tablet Take 1 tablet by mouth every 12 (twelve) hours as needed for Pain. 60 tablet 0 Past Week at Unknown time    insulin glargine (LANTUS U-100 INSULIN) 100 unit/mL injection INJECT 63 UNITS UNDER SKIN TWICE DAILY (Patient taking differently: Inject 60 Units into the skin 2 (two) times daily. ) 40 mL 5 Past Week at Unknown time    insulin lispro (HUMALOG U-100 INSULIN) 100 unit/mL injection INJECT 30 UNITS INTO THE SKIN THREE TIMES DAILY BEFORE MEALS (Patient taking differently: Inject 30 Units into the skin 3 (three) times daily before meals. ) 30 mL 5 Past Week at Unknown time    metFORMIN (GLUCOPHAGE) 1000 MG tablet Take 1,000 mg by mouth 2 (two) times daily with meals.   Past Week at Unknown time    metoclopramide HCl (REGLAN) 10 MG tablet Take 1 tablet (10 mg total) by mouth every evening. 30 tablet 0 1/22/2020 at Unknown time    montelukast (SINGULAIR) 10 mg tablet Take 1 tablet (10 mg total) by mouth every evening. 30 tablet 5 1/22/2020 at Unknown time    pantoprazole  "(PROTONIX) 40 MG tablet TAKE ONE TABLET BY MOUTH EVERY MORNING (Patient taking differently: Take 40 mg by mouth 2 (two) times daily. ) 30 tablet 5 1/22/2020 at Unknown time    pravastatin (PRAVACHOL) 40 MG tablet Take 1 tablet (40 mg total) by mouth nightly. 90 tablet 3 1/22/2020 at Unknown time    albuterol (PROVENTIL) 2.5 mg /3 mL (0.083 %) nebulizer solution Take 2.5 mg by nebulization every 6 (six) hours as needed.  5 Taking    ALCOHOL PREP PADS PadM use for insulin and testing blood sugar 100 each 5 Taking    EASY COMFORT INSULIN SYRINGE 0.5 mL 31 gauge x 5/16" Syrg    Taking    ELIQUIS 5 mg Tab TAKE ONE TABLET BY MOUTH TWICE DAILY 60 tablet 5 1/20/2020    insulin syringe-needle U-100 (EASY COMFORT INSULIN SYRINGE) 1 mL 31 gauge x 5/16 Syrg AS DIRECTED 5x DAILY 200 each 0 Taking    predniSONE (DELTASONE) 10 MG tablet Take 1 tablet (10 mg total) by mouth once daily. 42 tablet 0        Physical Exam:    Vital Signs:   Vitals:    01/23/20 0722   BP: 136/62   Pulse: 74   Resp: 17   Temp: 98.8 °F (37.1 °C)       General Appearance: Well appearing in no acute distress  Eyes:    No scleral icterus  Lungs: CTA anteriorly  Heart:  Regular rate, S1, S2 normal, no murmurs heard.  Abdomen: Soft, non tender, non distended with normal bowel sounds. No hepatosplenomegaly, ascites, or mass.  Extremities: No edema  Skin: No rash    Labs:  Lab Results   Component Value Date    WBC 11.80 01/18/2020    HGB 11.7 (L) 01/18/2020    HCT 36.9 (L) 01/18/2020     01/18/2020    CHOL 149 09/27/2018    TRIG 141 09/27/2018    HDL 31 (L) 09/27/2018    ALT 18 01/18/2020    AST 20 01/18/2020     01/18/2020    K 3.8 01/18/2020     01/18/2020    CREATININE 0.90 01/18/2020    BUN 35 (H) 01/18/2020    CO2 36 (H) 01/18/2020    INR 1.0 10/14/2019    HGBA1C 9.1 (H) 01/14/2020       I have explained the risks and benefits of endoscopy procedures to the patient including but not limited to bleeding, perforation, infection, and " death.      Nellie Emerson MD

## 2020-01-23 NOTE — TRANSFER OF CARE
"Anesthesia Transfer of Care Note    Patient: Jean Chao    Procedure(s) Performed: Procedure(s) (LRB):  EGD (ESOPHAGOGASTRODUODENOSCOPY) (N/A)    Patient location: PACU    Anesthesia Type: general    Transport from OR: Transported from OR on 6-10 L/min O2 by face mask with adequate spontaneous ventilation    Post pain: adequate analgesia    Post assessment: no apparent anesthetic complications    Post vital signs: stable    Level of consciousness: awake    Nausea/Vomiting: no nausea/vomiting    Complications: none    Transfer of care protocol was followed      Last vitals:   Visit Vitals  /62 (BP Location: Left arm, Patient Position: Lying)   Pulse 74   Temp 37 °C (98.6 °F) (Temporal)   Resp 18   Ht 5' 8" (1.727 m)   Wt (!) 190.5 kg (420 lb)   SpO2 96%   BMI 63.86 kg/m²     "

## 2020-01-23 NOTE — ANESTHESIA POSTPROCEDURE EVALUATION
Anesthesia Post Evaluation    Patient: Jean Chao    Procedure(s) Performed: Procedure(s) (LRB):  EGD (ESOPHAGOGASTRODUODENOSCOPY) (N/A)    Final Anesthesia Type: general    Patient location during evaluation: PACU  Patient participation: Yes- Able to Participate  Level of consciousness: awake and alert  Post-procedure vital signs: reviewed and stable  Pain management: adequate  Airway patency: patent    PONV status at discharge: No PONV  Anesthetic complications: no      Cardiovascular status: blood pressure returned to baseline  Respiratory status: unassisted  Hydration status: euvolemic  Follow-up not needed.          Vitals Value Taken Time   /63 1/23/2020  8:47 AM   Temp 37 °C (98.6 °F) 1/23/2020  8:43 AM   Pulse 73 1/23/2020  8:51 AM   Resp 11 1/23/2020  8:51 AM   SpO2 97 % 1/23/2020  8:51 AM   Vitals shown include unvalidated device data.      No case tracking events are documented in the log.      Pain/Dianelys Score: Dianelys Score: 9 (1/23/2020  8:43 AM)

## 2020-01-23 NOTE — PLAN OF CARE
D/C instructions given to pt and family. Pt. denies pain and n/v at this time. IV dc'd. VSS. Family at bs for d/c. All consents and AVS in chart at time of discharge.

## 2020-01-23 NOTE — ANESTHESIA PREPROCEDURE EVALUATION
01/23/2020  Jean Chao is a 59 y.o., male.    Anesthesia Evaluation    I have reviewed the Patient Summary Reports.     I have reviewed the Medications.     Review of Systems  Anesthesia Hx:  History of prior surgery of interest to airway management or planning:  Denies Personal Hx of Anesthesia complications.   Cardiovascular:   Hypertension Past MI CAD   NL BiV Fxn on last year echo   Pulmonary:   Asthma Sleep Apnea    Renal/:   Chronic Renal Disease    Hepatic/GI:   GERD    Neurological:   Headaches   Peripheral Neuropathy    Endocrine:   Diabetes Morbid obesity BMI >50   Psych:   anxiety          Physical Exam  General:  Morbid Obesity    Airway/Jaw/Neck:  Airway Findings: Mouth Opening: Normal Tongue: Normal  General Airway Assessment: Adult  Mallampati: III  Improves to II with phonation.  TM Distance: Normal, at least 6 cm  Jaw/Neck Findings:  Neck ROM: Normal ROM       Chest/Lungs:  Chest/Lungs Findings: Normal Respiratory Rate     Heart/Vascular:  Heart Findings: Rate: Normal        Mental Status:  Mental Status Findings:  Alert and Oriented         Anesthesia Plan  Type of Anesthesia, risks & benefits discussed:  Anesthesia Type:  general  Patient's Preference: General   Intra-op Monitoring Plan: standard ASA monitors  Intra-op Monitoring Plan Comments:   Post Op Pain Control Plan: per primary service following discharge from PACU  Post Op Pain Control Plan Comments:   Induction:   IV  Beta Blocker:  Patient is not currently on a Beta-Blocker (No further documentation required).       Informed Consent: Patient understands risks and agrees with Anesthesia plan.  Questions answered. Anesthesia consent signed with patient.  ASA Score: 4     Day of Surgery Review of History & Physical:    H&P update referred to the surgeon.     Anesthesia Plan Notes: NPO confirmed.   Confirmed albuterol  available.  Only propofol allowed per Dr. Emerson.          Ready For Surgery From Anesthesia Perspective.

## 2020-01-23 NOTE — [employer] PATIENT INSTRUCTIONS
Discharge Summary/Instructions after an Endoscopic Procedure  Patient Name: Jean Chao  Patient MRN: [id number]  Patient YOB: 1960  Thursday, January 23, 2020  Nellie Emerson MD  RESTRICTIONS:  During your procedure today, you received medications for sedation.  These   medications may affect your judgment, balance and coordination.  Therefore,   for 24 hours, you have the following restrictions:   - DO NOT drive a car, operate machinery, make legal/financial decisions,   sign important papers or drink alcohol.    ACTIVITY:  Today: no heavy lifting, straining or running due to procedural   sedation/anesthesia.  The following day: return to full activity including work.  DIET:  Eat and drink normally unless instructed otherwise.     TREATMENT FOR COMMON SIDE EFFECTS:  - Mild abdominal pain, nausea, belching, bloating or excessive gas:  rest,   eat lightly and use a heating pad.  - Sore Throat: treat with throat lozenges and/or gargle with warm salt   water.  - Because air was used during the procedure, expelling large amounts of air   from your rectum or belching is normal.  - If a bowel prep was taken, you may not have a bowel movement for 1-3 days.    This is normal.  SYMPTOMS TO WATCH FOR AND REPORT TO YOUR PHYSICIAN:  1. Abdominal pain or bloating, other than gas cramps.  2. Chest pain.  3. Back pain.  4. Signs of infection such as: chills or fever occurring within 24 hours   after the procedure.  5. Rectal bleeding, which would show as bright red, maroon, or black stools.   (A tablespoon of blood from the rectum is not serious, especially if   hemorrhoids are present.)  6. Vomiting.  7. Weakness or dizziness.  GO DIRECTLY TO THE NEAREST EMERGENCY ROOM IF YOU HAVE ANY OF THE FOLLOWING:      Difficulty breathing              Chills and/or fever over 101 F   Persistent vomiting and/or vomiting blood   Severe abdominal pain   Severe chest pain   Black, tarry stools   Bleeding- more than one  tablespoon   Any other symptom or condition that you feel may need urgent attention  Your doctor recommends these additional instructions:  If any biopsies were taken, your doctors clinic will contact you in 1 to 2   weeks with any results.  - Await pathology results.   - Discharge patient to home (with escort).   - Resume previous diet.   - Continue present medications.   - The findings and recommendations were discussed with the patient.   - Patient has a contact number available for emergencies.  The signs and   symptoms of potential delayed complications were discussed with the   patient.  Return to normal activities tomorrow.  Written discharge   instructions were provided to the patient.   - Resume aspirin and Eliquis (apixaban) at prior doses today.  Refer to   managing physician for further adjustment of therapy.  For questions, problems or results please call your physician - Nellie Emerson MD at Work:  [phone].  [facility] [place], EMERGENCY ROOM PHONE NUMBER: [phone]  IF A COMPLICATION OR EMERGENCY SITUATION ARISES AND YOU ARE UNABLE TO REACH   YOUR PHYSICIAN - GO DIRECTLY TO THE EMERGENCY ROOM.  Nellie Emerson MD  1/23/2020 8:33:08 AM  This report has been verified and signed electronically.  [employer]

## 2020-01-24 ENCOUNTER — TELEPHONE (OUTPATIENT)
Dept: GASTROENTEROLOGY | Facility: CLINIC | Age: 60
End: 2020-01-24

## 2020-01-24 NOTE — TELEPHONE ENCOUNTER
----- Message from Patricia Guerra NP sent at 1/24/2020  3:10 PM CST -----  Please inform PT:  Exam was somewhat limited due to standing images only. But it  is normal, barium tablet passed normally.    Thanks,  Mary NP

## 2020-01-27 ENCOUNTER — TELEPHONE (OUTPATIENT)
Dept: GASTROENTEROLOGY | Facility: CLINIC | Age: 60
End: 2020-01-27

## 2020-01-27 ENCOUNTER — HOSPITAL ENCOUNTER (OUTPATIENT)
Facility: HOSPITAL | Age: 60
Discharge: HOME | End: 2020-01-27
Attending: INTERNAL MEDICINE | Admitting: INTERNAL MEDICINE
Payer: MEDICARE

## 2020-01-27 VITALS
DIASTOLIC BLOOD PRESSURE: 63 MMHG | HEART RATE: 65 BPM | HEIGHT: 69 IN | BODY MASS INDEX: 46.65 KG/M2 | WEIGHT: 315 LBS | RESPIRATION RATE: 20 BRPM | TEMPERATURE: 98 F | SYSTOLIC BLOOD PRESSURE: 137 MMHG | OXYGEN SATURATION: 95 %

## 2020-01-27 DIAGNOSIS — R13.10 DYSPHAGIA: ICD-10-CM

## 2020-01-27 LAB — POCT GLUCOSE: 194 MG/DL (ref 70–110)

## 2020-01-27 PROCEDURE — 91037 ESOPH IMPED FUNCTION TEST: CPT | Performed by: INTERNAL MEDICINE

## 2020-01-27 PROCEDURE — 91010 ESOPHAGUS MOTILITY STUDY: CPT | Performed by: INTERNAL MEDICINE

## 2020-01-27 PROCEDURE — 91010 ESOPHAGUS MOTILITY STUDY: CPT | Mod: 26,,, | Performed by: INTERNAL MEDICINE

## 2020-01-27 PROCEDURE — 25000003 PHARM REV CODE 250: Performed by: INTERNAL MEDICINE

## 2020-01-27 PROCEDURE — 91010 PR ESOPHAGEAL MOTILITY STUDY, MA2METRY: ICD-10-PCS | Mod: 26,,, | Performed by: INTERNAL MEDICINE

## 2020-01-27 PROCEDURE — 91037 ESOPH IMPED FUNCTION TEST: CPT | Mod: 26,,, | Performed by: INTERNAL MEDICINE

## 2020-01-27 PROCEDURE — 91037 PR GERD TST W/ NASAL IMPEDENCE ELECTROD: ICD-10-PCS | Mod: 26,,, | Performed by: INTERNAL MEDICINE

## 2020-01-27 RX ORDER — LIDOCAINE HYDROCHLORIDE 20 MG/ML
JELLY TOPICAL ONCE
Status: COMPLETED | OUTPATIENT
Start: 2020-01-27 | End: 2020-01-27

## 2020-01-27 RX ADMIN — LIDOCAINE HYDROCHLORIDE 10 ML: 20 JELLY TOPICAL at 07:01

## 2020-01-28 NOTE — TELEPHONE ENCOUNTER
Manometry Results:    High LES pressure with complete relaxation  Some fragmented swallows, some pressurization   Study does not meet criteria for any [place] Classification abnormality   Complete bolus clearance  Multiple rapid swallows test with failure to produce a strong final contraction  Pressurization resolves upright  Ineffective esophageal motility upright and with other provocative maneuvers   No evidence of residual liquid in esophagus at the end of the study with 200 cc saline bolus  Results may be affected by narcotic use      Please let patient know that the manometry shows    Weak muscles of the esophagus    Recommendation:  Follow up with Dr. Emerson or TT  after all studies completed

## 2020-01-31 LAB
FINAL PATHOLOGIC DIAGNOSIS: NORMAL
GROSS: NORMAL

## 2020-02-03 ENCOUNTER — TELEPHONE (OUTPATIENT)
Dept: GASTROENTEROLOGY | Facility: CLINIC | Age: 60
End: 2020-02-03

## 2020-02-03 ENCOUNTER — TELEPHONE (OUTPATIENT)
Dept: ENDOSCOPY | Facility: HOSPITAL | Age: 60
End: 2020-02-03

## 2020-02-03 NOTE — TELEPHONE ENCOUNTER
----- Message from Nellie Emerson MD sent at 2/3/2020  1:28 PM CST -----  Please let the pt know that pathology from recent procedure shows:    No  significant abnormality      Endoscopy and colonoscopy is not a perfect exam of the colon. Rarely a significant polyp or colon cancer can be missed. He/she should not ignore symptoms such as blood with bowel movements or abdominal pain and report these symptoms to the doctor if they occur.     Should follow up with Dr. Emerson or DONTE Guerra NP next available

## 2020-02-21 PROBLEM — R50.9 FEVER: Status: ACTIVE | Noted: 2020-02-21

## 2020-02-22 PROBLEM — J10.1 INFLUENZA A: Status: ACTIVE | Noted: 2020-02-22

## 2020-03-05 ENCOUNTER — OFFICE VISIT (OUTPATIENT)
Dept: GASTROENTEROLOGY | Facility: CLINIC | Age: 60
End: 2020-03-05
Payer: MEDICARE

## 2020-03-05 VITALS
DIASTOLIC BLOOD PRESSURE: 66 MMHG | HEIGHT: 69 IN | WEIGHT: 315 LBS | BODY MASS INDEX: 46.65 KG/M2 | HEART RATE: 74 BPM | SYSTOLIC BLOOD PRESSURE: 141 MMHG

## 2020-03-05 DIAGNOSIS — K21.9 GASTROESOPHAGEAL REFLUX DISEASE, ESOPHAGITIS PRESENCE NOT SPECIFIED: ICD-10-CM

## 2020-03-05 DIAGNOSIS — K59.89 VISCERAL HYPERSENSITIVITY SYNDROME: Primary | ICD-10-CM

## 2020-03-05 DIAGNOSIS — R07.9 CHEST PAIN, UNSPECIFIED TYPE: ICD-10-CM

## 2020-03-05 DIAGNOSIS — R13.10 DYSPHAGIA, UNSPECIFIED TYPE: ICD-10-CM

## 2020-03-05 PROCEDURE — 99214 OFFICE O/P EST MOD 30 MIN: CPT | Mod: S$GLB,,, | Performed by: NURSE PRACTITIONER

## 2020-03-05 PROCEDURE — 3078F DIAST BP <80 MM HG: CPT | Mod: CPTII,S$GLB,, | Performed by: NURSE PRACTITIONER

## 2020-03-05 PROCEDURE — 3008F BODY MASS INDEX DOCD: CPT | Mod: CPTII,S$GLB,, | Performed by: NURSE PRACTITIONER

## 2020-03-05 PROCEDURE — 3008F PR BODY MASS INDEX (BMI) DOCUMENTED: ICD-10-PCS | Mod: CPTII,S$GLB,, | Performed by: NURSE PRACTITIONER

## 2020-03-05 PROCEDURE — 99999 PR PBB SHADOW E&M-EST. PATIENT-LVL V: CPT | Mod: PBBFAC,,, | Performed by: NURSE PRACTITIONER

## 2020-03-05 PROCEDURE — 99999 PR PBB SHADOW E&M-EST. PATIENT-LVL V: ICD-10-PCS | Mod: PBBFAC,,, | Performed by: NURSE PRACTITIONER

## 2020-03-05 PROCEDURE — 3078F PR MOST RECENT DIASTOLIC BLOOD PRESSURE < 80 MM HG: ICD-10-PCS | Mod: CPTII,S$GLB,, | Performed by: NURSE PRACTITIONER

## 2020-03-05 PROCEDURE — 99214 PR OFFICE/OUTPT VISIT, EST, LEVL IV, 30-39 MIN: ICD-10-PCS | Mod: S$GLB,,, | Performed by: NURSE PRACTITIONER

## 2020-03-05 PROCEDURE — 3077F SYST BP >= 140 MM HG: CPT | Mod: CPTII,S$GLB,, | Performed by: NURSE PRACTITIONER

## 2020-03-05 PROCEDURE — 3077F PR MOST RECENT SYSTOLIC BLOOD PRESSURE >= 140 MM HG: ICD-10-PCS | Mod: CPTII,S$GLB,, | Performed by: NURSE PRACTITIONER

## 2020-03-05 RX ORDER — NORTRIPTYLINE HYDROCHLORIDE 25 MG/1
25 CAPSULE ORAL NIGHTLY
Qty: 30 CAPSULE | Refills: 11 | Status: SHIPPED | OUTPATIENT
Start: 2020-03-05 | End: 2020-10-20 | Stop reason: ALTCHOICE

## 2020-03-05 NOTE — PROGRESS NOTES
[facility] Consultation Note    Reason for Consult:    Chief Complaint   Patient presents with    Heartburn    Chest Pain    Dysphagia     trouble swallowing    Gas     bloating         PCP:   Yomi Smith       Referring MD:  Ciro Angulo Md  [address] [address]      HPI:  Jean Chao is a 59 y.o. male with a past medical history of anemia, asthma, anxiety, MDD, HLD, HTN, DM 2, AFib, migraine, JENNIFER, chronic back pain referred to motility clinic for second opinion regarding the following problems:    GERD.  Patient reports bothersome heartburn. unchanged.   Onset:few yrs ago  Retrosternal pyrosis:yes  Regurgitation:yes  Belching:yes  Frequency: sometimes daily. Other times few days weekly  Improve with:     No improvement with protonix 40 mg twice daily. No improvement with nexium BID. Cannot recall if he has tried prilosec, dexilant, aciphex, prevacid.   Upright symptoms: yes  Nocturnal symptoms:  yes  Hoarseness:sometimes  Cough:yes, very significant  Throat clearing:yes  Food Triggers:none noticed  Caffeine intake:no  Sleeps with head of the bed elevated.   Avoids eating prior to bedtime.   Has not tried gaviscon as previously advised     Chest pain.  Sharp pain, ache. RU chest. Not associated with exertion. Not associated with meals/swallowing. Unchanged.   Onset: few yrs ago  Frequency: sometimes daily, sometime few days weekly       Triggers (cold fluids, caffeine, smoking, ETOH):none noticed  Consumes mostly soft foods and liquids:regular diet  Negative cardiac workup:  Yes. EKG, ECHO, angiogram negative, stress test scheduled for next month.     Has not tried nifedipine   diltiazem   isosorrbide dinitrate   peppermint oil   sildenafil   trazodone  Botox    Dysphagia.  Reports difficulty swallowing. Unchanged.   Onset of symptoms:few yrs ago  Problems with solids:yes  Problems with liquids:no  Choking while eating:yes   Coughing while eating:  yes  Location: upper esophagus   Frequency:  Couple days weekly  Improves with:nothing in particular, sometimes has to spit materials back up for relief  Narcotic pain meds:norco   History of food impactions requiring ED visit:no  History of allergies:medications  History of seasonal allergies:no  History of food allergies:no  History of eczema:no    Achalasia Eckardt Score  Dysphagia  (none (0), occasional (1), daily (2), with every meal (3)): 1  Regurgitation (none (0), occasional (1), daily (2), with every meal (3)): 1  Chest pain (none (0), occasional (1), daily (2), several times per day (3)): 2  Weight loss (kg) (0 (0), <5 (1), 5-10 (2), >10 (3)): 0  Total Eckardt Score(the final score is the sum of four components (0-12)):  4/12    Anemia. Low HGB, normal Iron, normal ferritin. Not on iron.    Anxiety. Depression. On xanax 0.5 mg daily per pcp. Has not never seen psych    JENNIFER. Uses biPAP nightly.     Chronic back pain. On norco 7.5 mg BID x few yrs per pcp.     Abdominal pain. LUQ. Onset: few yrs ago. Sometimes worse with meals.    Gas and bloating. With distention. Onset: few years ago. Worse with meals. Consumes lactose. And diet coke.     Denies nausea, vomiting, early satiety, diarrhea, constipation, BRBPR, melena, weight loss,  insomnia.       Total visit time was 40 minutes, more than 50% of which was spent in face-to-face counseling with patient regarding symptoms, diagnostic results, prognosis, risks and benefits of treatment options, instructions for management, importance of compliance with chosen treatment options, risk factor reduction, stress reduction, coping strategies.      Previous Studies:   EKG 2/21/20:nl   Esophageal manometry 01/27/2020:  High LES pressure with complete relaxation.  Fragmented swallows, some pressurization.  Study does not meet criteria for initial cardio classification abnormality.  Complete bolus clearance.  Multiple rapid swallow test with failure to produce a  strong final contraction.  Pressurization resolves upright.  Ineffective esophageal motility upright and with other provocative maneuvers.  No evidence of residual liquid in the esophagus at the end of the study with 200 cc saline bolus.  Results may be affected by narcotic use.  Esophagram 01/23/2020:  Limited study due to only standing images could be obtained. Normal timed barium swallow.  13 mm barium tablet passed without difficulty.  EGD 01/23/2020:  Normal esophagus.  Esophageal polyps (chronic inflammation).  Gastric erythema (chronic inflammation and reactive change).  Few gastric polyps (gastric foveal a regenerative hyperplasia).  Normal duodenum.  Endo flip 01/23/2020:  Suspicious full borderline EGJ outflow obstruction at 60 mL an normal at 70 mL.  EGD 9/5/19: nl esophagus. Gastritis. Nl duodenum.   Chest xray 10/14/19:The heart is mildly enlarged.  There is central pulmonary vascular congestion and suspected mild edema.  No consolidation or pleural effusion.  Skeletal structures are intact.  Cervical fusion hardware is seen.  CT chest 9/4/19:No CT evidence of active chest disease.  Upper GI without KUB 08/20/2019:  Limited study due to large body size with poor clearing of the esophagus with peristalsis and no gross abnormality of the stomach. To and fro flow of contrast into the esophagus.  Upper GI with KUB 4/26/18:  Normal.  Modified barium swallow study 11/23/2016:No evidence of penetration or aspiration. Speech path note not available in epic.   Per pt, colonoscopy 7 years ago: normal. Rpt 10 yrs.    ROS:  ROS   Constitutional: + fevers, + chills, + night sweats, no weight loss  ENT: + congestion, + rhinorrhea, no chronic sinus problems  CV: + chest pain, no palpitations  Pulm: + cough, + shortness of breath  Ophtho: No blurry vision, no eye redness  GI: see HPI  Derm: No rash  Heme: No lymphadenopathy, no bruising  MSK: + joint pain, no joint swelling, no Raynauds  : No dysuria, no frequent  urination, no blood in urine  Endo: No hot or cold intolerance  Neuro: no dizziness, no syncope, no seizure  Psych: + anxiety, + depression        Medical History:   Past Medical History:   Diagnosis Date    Acid reflux     Anticoagulant long-term use     Eliquis for a-fib    Anxiety disorder     Atrial fibrillation     Back pain     Coronary artery disease     Diabetes     Diabetes mellitus     Diabetes mellitus, type 2     HTN (hypertension)     Hypercholesteremia     Hypertension     Migraine     Myocardial infarction     JENNIFER treated with BiPAP     Oxygen dependent     prn    Respiratory distress     chronic cough    Sleep apnea         Surgical History:   Past Surgical History:   Procedure Laterality Date    ANKLE SURGERY      ANKLE SURGERY Bilateral     ANTERIOR CERVICAL DISCECTOMY W/ FUSION      carpal tunnel both hands      ESOPHAGEAL MANOMETRY WITH MEASUREMENT OF IMPEDANCE N/A 1/27/2020    Procedure: MANOMETRY, ESOPHAGUS, WITH IMPEDANCE MEASUREMENT;  Surgeon: Nellie Emerson MD;  Location: [facility] (4TH FLR);  Service: Endoscopy;  Laterality: N/A;  BMI>50, Bariatric stretcher needed  Hold Narcotics x 1 days   Hold TCA x 1 days  Propofol only. No fentanyl or benzodiazepine during sedation. If additional sedation needed, discuss with Dr. Emerson.  1/20 - pt confirmed appt    ESOPHAGOGASTRODUODENOSCOPY N/A 9/5/2019    Procedure: EGD (ESOPHAGOGASTRODUODENOSCOPY);  Surgeon: Ciro Angulo MD;  Location[facility];  Service: Endoscopy;  Laterality: N/A;    ESOPHAGOGASTRODUODENOSCOPY N/A 1/23/2020    Procedure: EGD (ESOPHAGOGASTRODUODENOSCOPY);  Surgeon: Nellie Emerson MD;  [facility] F[facility]);  Service: Endoscopy;  Laterality: N/A;  EGD with EndoFlip   Reason for 2nd Floor:   Gastroparesis    BMI>50, Bariatric stretcher needed  Full liquids xs 3 days and Clear liquids xs 1 day prior to procedure-BB  Cardiac Clearance and approval to hold Eliquis xs 2 days prior to  procedure receive    HERNIA REPAIR      NECK SURGERY      SHOULDER SURGERY      SHOULDER SURGERY Bilateral     UVULOPALATOPHARYNGOPLASTY      WRIST SURGERY Bilateral         Family History:   Family History   Problem Relation Age of Onset    No Known Problems Mother     Cancer Father     Colon cancer Neg Hx     Esophageal cancer Neg Hx     Rectal cancer Neg Hx     Stomach cancer Neg Hx     Ulcerative colitis Neg Hx     Crohn's disease Neg Hx     Celiac disease Neg Hx         Social History:   Social History     Socioeconomic History    Marital status: Legally      Spouse name: Not on file    Number of children: Not on file    Years of education: Not on file    Highest education level: Not on file   Occupational History    Occupation: Disabled   Social Needs    Financial resource strain: Not on file    Food insecurity:     Worry: Not on file     Inability: Not on file    Transportation needs:     Medical: Not on file     Non-medical: Not on file   Tobacco Use    Smoking status: Never Smoker    Smokeless tobacco: Never Used   Substance and Sexual Activity    Alcohol use: Yes     Comment: seldom    Drug use: Yes     Types: Hydrocodone    Sexual activity: Not Currently   Lifestyle    Physical activity:     Days per week: Not on file     Minutes per session: Not on file    Stress: Very much   Relationships    Social connections:     Talks on phone: Not on file     Gets together: Not on file     Attends Taoist service: Not on file     Active member of club or organization: Not on file     Attends meetings of clubs or organizations: Not on file     Relationship status: Not on file   Other Topics Concern    Not on file   Social History Narrative    ** Merged History Encounter **             Review of patient's allergies indicates:   Allergen Reactions    Spironolactone      gynecomatia       Current Outpatient Medications   Medication Sig Dispense Refill    acetaminophen  "(TYLENOL) 500 MG tablet Take 2 tablets (1,000 mg total) by mouth every 6 (six) hours as needed.  0    albuterol (PROVENTIL) 2.5 mg /3 mL (0.083 %) nebulizer solution Take 2.5 mg by nebulization every 6 (six) hours as needed.  5    albuterol (PROVENTIL/VENTOLIN HFA) 90 mcg/actuation inhaler Inhale 2 puffs into the lungs every 6 (six) hours as needed for Wheezing (COUGH). 18 g 0    ALCOHOL PREP PADS PadM use for insulin and testing blood sugar 100 each 5    ALPRAZolam (XANAX) 0.5 MG tablet Take 1 tablet (0.5 mg total) by mouth daily as needed. 30 tablet 0    amLODIPine (NORVASC) 5 MG tablet TAKE ONE TABLET BY MOUTH EVERY MORNING 30 tablet 5 up    amoxicillin-clavulanate 875-125mg (AUGMENTIN) 875-125 mg per tablet Take 1 tablet by mouth 2 (two) times daily. (Patient not taking: Reported on 3/2/2020) 14 tablet 0    aspirin 81 MG Chew Take 81 mg by mouth once daily.      blood sugar diagnostic (TRUE METRIX GLUCOSE TEST STRIP) Strp Test blood sugar twice daily 100 strip 3    blood-glucose meter (TRUE METRIX GLUCOSE METER) Misc Test blood sugar twice daily 1 each 0    BREO ELLIPTA 200-25 mcg/dose DsDv diskus inhaler Inhale 1 puff into the lungs once daily. Controller 1 each 5    carvediloL (COREG) 12.5 MG tablet TAKE ONE TABLET BY MOUTH TWICE DAILY 60 tablet 5    doxycycline (VIBRA-TABS) 100 MG tablet Take 1 tablet (100 mg total) by mouth 2 (two) times daily. 14 tablet 0    EASY COMFORT INSULIN SYRINGE 0.5 mL 31 gauge x 5/16" Syrg       ELIQUIS 5 mg Tab TAKE ONE TABLET BY MOUTH TWICE DAILY 60 tablet 5    furosemide (LASIX) 20 MG tablet Take 1 tablet (20 mg total) by mouth 2 (two) times daily. 20 tablet 0    furosemide (LASIX) 40 MG tablet TAKE ONE TABLET BY MOUTH TWICE DAILY 60 tablet 5    hydrALAZINE (APRESOLINE) 50 MG tablet TAKE ONE TABLET BY MOUTH TWICE DAILY 60 tablet 5    HYDROcodone-acetaminophen (NORCO) 7.5-325 mg per tablet Take 1 tablet by mouth every 12 (twelve) hours as needed for Pain. 60 " "tablet 0    insulin glargine (LANTUS U-100 INSULIN) 100 unit/mL injection INJECT 63 UNITS UNDER SKIN TWICE DAILY (Patient taking differently: Inject 60 Units into the skin 2 (two) times daily. ) 40 mL 5    insulin lispro (HUMALOG U-100 INSULIN) 100 unit/mL injection INJECT 30 UNITS INTO THE SKIN THREE TIMES DAILY BEFORE MEALS (Patient taking differently: Inject 30 Units into the skin 3 (three) times daily before meals. ) 30 mL 5    insulin syringe-needle U-100 (EASY COMFORT INSULIN SYRINGE) 1 mL 31 gauge x 5/16 Syrg AS DIRECTED 5x DAILY 200 each 0    lancets (ONETOUCH DELICA LANCETS) 33 gauge Misc Test blood sugar twice daily 100 each 3    metFORMIN (GLUCOPHAGE) 1000 MG tablet Take 1,000 mg by mouth 2 (two) times daily with meals.      metoclopramide HCl (REGLAN) 10 MG tablet Take 1 tablet (10 mg total) by mouth every evening. 30 tablet 0    montelukast (SINGULAIR) 10 mg tablet Take 1 tablet (10 mg total) by mouth every evening. 30 tablet 5    nortriptyline (PAMELOR) 25 MG capsule Take 1 capsule (25 mg total) by mouth every evening. 30 capsule 11    pantoprazole (PROTONIX) 40 MG tablet TAKE ONE TABLET BY MOUTH EVERY MORNING (Patient taking differently: Take 40 mg by mouth 2 (two) times daily. ) 30 tablet 5    pravastatin (PRAVACHOL) 40 MG tablet Take 1 tablet (40 mg total) by mouth nightly. 90 tablet 3    predniSONE (DELTASONE) 20 MG tablet 20 mg twice daily for 3 days then 20 mg daily for 3 days then stop 9 tablet 0     No current facility-administered medications for this visit.         Objective Findings:  Vital Signs:  BP (!) 141/66   Pulse 74   Ht 5' 9" (1.753 m)   Wt (!) 191.9 kg (423 lb 1 oz)   BMI 62.48 kg/m²   Body mass index is 62.48 kg/m².    Physical Exam:  General appearance: alert, cooperative, no distress  HENT: Normocephalic, atraumatic, neck symmetrical, no nasal discharge, Lips, mucosa, and tongue normal; teeth and gums normal  Eyes: conjunctivae/corneas clear, EOM's intact  Lungs: " CTA bilaterally in anterior and posterior fields, no wheezes, no crackles.  Heart: Regular rate and rhythm, S1, S2 normal, no murmurs heard  Abdomen: oft, non tender, non distended with positive bowel sounds in all four quadrants. No hepatosplenomegaly, ascites, or mass  Extremities: extremities symmetric; no clubbing, cyanosis, or edema  Integument: Skin color, texture, turgor normal; no rashes; hair distrubution normal  Neurologic: Alert and oriented X 3, normal strength, normal coordination and gait  Psychiatric: no pressured speech; normal affect; no evidence of impaired cognition    Labs:  Lab Results   Component Value Date    WBC 5.90 02/22/2020    HGB 10.5 (L) 02/22/2020    HCT 32.9 (L) 02/22/2020    MCV 77 (L) 02/22/2020     02/22/2020     No results found for: FERRITIN  Lab Results   Component Value Date     02/22/2020    K 3.9 02/22/2020    CL 99 02/22/2020    CO2 31 (H) 02/22/2020     (H) 02/22/2020    BUN 18 02/22/2020    CREATININE 0.90 02/22/2020    CALCIUM 8.7 02/22/2020    PROT 7.4 02/21/2020    ALBUMIN 4.1 02/21/2020    BILITOT 0.7 02/21/2020    ALKPHOS 70 02/21/2020    AST 28 02/21/2020    ALT 23 02/21/2020     No results found for: TSH  Lab Results   Component Value Date    SEDRATE 35 (H) 09/13/2017     Lab Results   Component Value Date    CRP 18.7 (H) 09/13/2017     Lab Results   Component Value Date    HGBA1C 9.1 (H) 01/14/2020       Lab Results   Component Value Date    IRON 80 11/15/2018    TIBC 289 11/15/2018         Assessment and Plan:  Jean Chao is a 59 y.o. male with a past medical history of anemia, asthma, anxiety, MDD, HLD, HTN, DM 2, AFib, migraine, JENNIFER, chronic back pain referred to motility clinic for second opinion regarding the following problems:    GERD.  And chronic cough. EGD unrevealing.   No improvement with nexium BID  No improvement with protonix 40 mg twice daily  Cannot recall if he has tried prilosec, dexilant, aciphex, prevacid.   -Cont  protonix 40 mg BID  -Add OTC gaviscon w alginate PRN as previously advised  -Consider pH testing    Chest pain. Followed by cardiology for A.fib, h/o MI:previous EKG, ECHO, angiogram negative, stress test scheduled for next month. EGD unrevealing.esophageal manometry with ineffective esophageal motility. TBS with 13 mm BT normal.   -Cont PPI BID  -EKG in 2/2020 with normal QTC. Start nortriptyline 25 mg HS for neuromodulation.  -Has not tried nifedipine, diltiazem, isosorrbide dinitrate, peppermint oil, sildenafil, trazodone, Botox    Dysphagia.  Previously esophagram with poor clearing of the esophagus with peristalsis and to and fro flow of contrast into the esophagus. Previous MBSS with no penetration or aspiration (full report not viewable in epic). EGD unrevealing. endoFlip Suspicious full borderline EGJ outflow obstruction at 60 mL an normal at 70 mL.  Esophagram normal, 13 mm BT passed normally. Achalasia Eckardt score of 4/12.   On Norco  -EKG in 2/2020 with normal QTC. Start nortriptyline 25 mg HS for neuromodulation.      Abdominal pain. LUQ. Onset: few yrs ago. Sometimes worse with meals.  -Defer to referring GI provider    Gas and bloating. With distention. Onset: few years ago. Worse with meals. Consumes lactose. And diet coke.   -Defer to referring GI provider.    Anemia. Low HGB, normal Iron, normal ferritin. Not on iron.  -Defer to referring GI provider    Anxiety. Depression. On xanax 0.5 mg daily per pcp. Has not never seen psych  -Discussed the benefit of TCA and antidepressants in management of functional GI disorders.    -EKG in 2/2020 with normal QTC. Start nortriptyline 25 mg HS for neuromodulation.    JENNIFER. Uses biPAP nightly.     Chronic back pain. On norco 7.5 mg BID x few yrs per pcp.     Follow up in about 4 months (around 7/5/2020) for Motility w Dr. Emerson.    Pt advised that Dr. Emerson and I act as a consult service and do not accept patients to be their primary GI providers. Discussed  that the goal of our visit is to address relevant motility problems while deferring other GI problems as well as screening and surveillance to his primary GI provider.   Discussed that he needs to continue to follow with his local primary GI provider.  Discussed that we will complete his/her workup, clarify diagnosis and attempt to optimize his/her symptoms with intention of him/her returning to referring GI provider for long term GI care.       1. Visceral hypersensitivity syndrome    2. Gastroesophageal reflux disease, esophagitis presence not specified    3. Chest pain, unspecified type    4. Dysphagia, unspecified type          Order summary:  Orders Placed This Encounter    nortriptyline (PAMELOR) 25 MG capsule           Thank you so much for allowing me to participate in the care of Jean Guerra, APRN, FNP-C

## 2020-03-05 NOTE — PATIENT INSTRUCTIONS
Continue protonix 40 mg twice daily for acid reflux  Take over the counter gaviscon with algintate 1-4 times daily as needed for reflux. Take this in addition to your routine reflux medications. You may have to order it online (ie amazon.com).    Start nortriptyline 25 mg nightly for difficulty swallowing, chest pain, and other symptoms of hypersensitivity of the GI tract.

## 2020-03-16 PROBLEM — D50.9 MICROCYTIC ANEMIA: Status: ACTIVE | Noted: 2018-11-14

## 2020-03-16 PROBLEM — D50.9 MICROCYTIC ANEMIA: Chronic | Status: ACTIVE | Noted: 2018-11-14

## 2020-03-23 PROBLEM — E66.2 OBESITY HYPOVENTILATION SYNDROME: Chronic | Status: ACTIVE | Noted: 2019-09-06

## 2020-04-15 PROBLEM — D64.9 NORMOCYTIC ANEMIA: Status: ACTIVE | Noted: 2020-04-15

## 2020-06-29 ENCOUNTER — TELEPHONE (OUTPATIENT)
Dept: GASTROENTEROLOGY | Facility: CLINIC | Age: 60
End: 2020-06-29

## 2020-07-16 ENCOUNTER — TELEPHONE (OUTPATIENT)
Dept: GASTROENTEROLOGY | Facility: CLINIC | Age: 60
End: 2020-07-16

## 2020-07-16 NOTE — TELEPHONE ENCOUNTER
Tried to get patient to move over to virtual and attempted to set up portal. Pt states he is not efficient with the technology.

## 2020-07-17 ENCOUNTER — TELEPHONE (OUTPATIENT)
Dept: GASTROENTEROLOGY | Facility: CLINIC | Age: 60
End: 2020-07-17

## 2020-07-17 ENCOUNTER — HOSPITAL ENCOUNTER (OUTPATIENT)
Dept: CARDIOLOGY | Facility: CLINIC | Age: 60
Discharge: HOME | End: 2020-07-17
Payer: MEDICARE

## 2020-07-17 ENCOUNTER — OFFICE VISIT (OUTPATIENT)
Dept: GASTROENTEROLOGY | Facility: CLINIC | Age: 60
End: 2020-07-17
Payer: MEDICARE

## 2020-07-17 VITALS
DIASTOLIC BLOOD PRESSURE: 73 MMHG | HEIGHT: 69 IN | WEIGHT: 315 LBS | BODY MASS INDEX: 46.65 KG/M2 | SYSTOLIC BLOOD PRESSURE: 125 MMHG | HEART RATE: 80 BPM

## 2020-07-17 DIAGNOSIS — Z51.81 THERAPEUTIC DRUG MONITORING: Primary | ICD-10-CM

## 2020-07-17 DIAGNOSIS — G89.29 OTHER CHRONIC PAIN: ICD-10-CM

## 2020-07-17 DIAGNOSIS — R07.89 NON-CARDIAC CHEST PAIN: ICD-10-CM

## 2020-07-17 DIAGNOSIS — K21.9 GASTROESOPHAGEAL REFLUX DISEASE, ESOPHAGITIS PRESENCE NOT SPECIFIED: ICD-10-CM

## 2020-07-17 DIAGNOSIS — E66.9 OBESITY, UNSPECIFIED CLASSIFICATION, UNSPECIFIED OBESITY TYPE, UNSPECIFIED WHETHER SERIOUS COMORBIDITY PRESENT: ICD-10-CM

## 2020-07-17 DIAGNOSIS — Z51.81 THERAPEUTIC DRUG MONITORING: ICD-10-CM

## 2020-07-17 DIAGNOSIS — R13.10 DYSPHAGIA, UNSPECIFIED TYPE: ICD-10-CM

## 2020-07-17 PROCEDURE — 99215 OFFICE O/P EST HI 40 MIN: CPT | Mod: PBBFAC | Performed by: INTERNAL MEDICINE

## 2020-07-17 PROCEDURE — 99999 PR PBB SHADOW E&M-EST. PATIENT-LVL V: CPT | Mod: PBBFAC,,, | Performed by: INTERNAL MEDICINE

## 2020-07-17 PROCEDURE — 93005 ELECTROCARDIOGRAM TRACING: CPT | Mod: S$GLB,,, | Performed by: INTERNAL MEDICINE

## 2020-07-17 PROCEDURE — 93010 ELECTROCARDIOGRAM REPORT: CPT | Mod: S$GLB,,, | Performed by: INTERNAL MEDICINE

## 2020-07-17 PROCEDURE — 99214 OFFICE O/P EST MOD 30 MIN: CPT | Mod: S$PBB,,, | Performed by: INTERNAL MEDICINE

## 2020-07-17 PROCEDURE — 99999 PR PBB SHADOW E&M-EST. PATIENT-LVL V: ICD-10-PCS | Mod: PBBFAC,,, | Performed by: INTERNAL MEDICINE

## 2020-07-17 PROCEDURE — 93005 EKG 12-LEAD: ICD-10-PCS | Mod: S$GLB,,, | Performed by: INTERNAL MEDICINE

## 2020-07-17 PROCEDURE — 93010 EKG 12-LEAD: ICD-10-PCS | Mod: S$GLB,,, | Performed by: INTERNAL MEDICINE

## 2020-07-17 PROCEDURE — 99214 PR OFFICE/OUTPT VISIT, EST, LEVL IV, 30-39 MIN: ICD-10-PCS | Mod: S$PBB,,, | Performed by: INTERNAL MEDICINE

## 2020-07-17 NOTE — TELEPHONE ENCOUNTER
EKG w Afib, not seen on last EKG  Patient reports that he has had a history of AFib, is currently on Eliquis for AFib, but had a procedure by Cardiology that supposedly treated the AFib.  Patient was instructed to follow-up with his cardiologist on Monday to inform them that his recent EKGs showing AFib  Please make sure patient follows up with cardiology or PCP next week.  He needs to get clearance to increase dose of nortriptyline from his cardiologist  Please [facility] [place]

## 2020-07-17 NOTE — PATIENT INSTRUCTIONS
-Check EGD with BRAVO to see how severe your acid reflux is  -Check EKG.  If normal we will increase nortriptyline to 50mg at night   -Your swallowing problems and chest pain are likely related to chronic narcotic use.  Please work with your primary care doctor to decrease your narcotic dose   -Continue to follow up with your primary GI provider, Dr. Ciro Angulo for management of your chronic GI problems and colon cancer screening.

## 2020-07-17 NOTE — PROGRESS NOTES
[facility] Consultation Note    Reason for Consult:    Chief Complaint   Patient presents with    Heartburn    Chest Pain    Dysphagia    Gas    Bloated         PCP:   Yomi Smith       Referring MD:  Ciro Angulo Md  [address] [address]      HPI:  Jean Chao is a 59 y.o. male with a past medical history of anemia, asthma, anxiety, MDD, HLD, HTN, DM 2, AFib, migraine, JENNIFER, chronic back pain referred to motility clinic for second opinion regarding the following problems:    GERD.   Retrosternal pyrosis: 1-2 per day   Regurgitation: better, few per week  MEDS  protonix 40 mg twice daily.    Chest pain.  Daily to few per week     Dysphagia.  Few per month   Problems with solids:yes  Problems with liquids:yes  Choking while eating:yes   Coughing while eating: yes  Location: upper esophagus     Achalasia Eckardt Score  Dysphagia  (none (0), occasional (1), daily (2), with every meal (3)): 1  Regurgitation (none (0), occasional (1), daily (2), with every meal (3)): 1  Chest pain (none (0), occasional (1), daily (2), several times per day (3)): 2  Weight loss (kg) (0 (0), <5 (1), 5-10 (2), >10 (3)): 0  Total Eckardt Score(the final score is the sum of four components (0-12)):  4/12    MEDs:   notriptyline 25mg without improvement     Anxiety. Depression.   On xanax 0.5 mg daily per pcp.   Has not never seen psych    JENNIFER. Uses biPAP nightly, stopped for a while bc of concern for too much air in the stomach - bloated in am.  Mack snot feel rested in am     Chronic back pain.   On norco 7.5 mg BID x few yrs per pcp. Trying to decrease     Trying to loose weight     Denies nausea, vomiting, early satiety, diarrhea, constipation, BRBPR, melena, weight loss, insomnia.       Total visit time was 25 minutes, more than 50% of which was spent in face-to-face counseling with patient regarding symptoms, diagnostic results, prognosis, risks and benefits of treatment  options, instructions for management, importance of compliance with chosen treatment options, risk factor reduction, stress reduction, coping strategies.      Previous Studies:   EKG 2/21/20:nl   Esophageal manometry 01/27/2020:  High LES pressure with complete relaxation.  Fragmented swallows, some pressurization.  Study does not meet criteria for initial cardio classification abnormality.  Complete bolus clearance.  Multiple rapid swallow test with failure to produce a strong final contraction.  Pressurization resolves upright.  Ineffective esophageal motility upright and with other provocative maneuvers.  No evidence of residual liquid in the esophagus at the end of the study with 200 cc saline bolus.  Results may be affected by narcotic use.  Esophagram 01/23/2020:  Limited study due to only standing images could be obtained. Normal timed barium swallow.  13 mm barium tablet passed without difficulty.  EGD 01/23/2020:  Normal esophagus.  Esophageal polyps (chronic inflammation).  Gastric erythema (chronic inflammation and reactive change).  Few gastric polyps (gastric foveal a regenerative hyperplasia).  Normal duodenum.  Endo flip 01/23/2020:  Suspicious full borderline EGJ outflow obstruction at 60 mL an normal at 70 mL.  EGD 9/5/19: nl esophagus. Gastritis. Nl duodenum.   Chest xray 10/14/19:The heart is mildly enlarged.  There is central pulmonary vascular congestion and suspected mild edema.  No consolidation or pleural effusion.  Skeletal structures are intact.  Cervical fusion hardware is seen.  CT chest 9/4/19:No CT evidence of active chest disease.  Upper GI without KUB 08/20/2019:  Limited study due to large body size with poor clearing of the esophagus with peristalsis and no gross abnormality of the stomach. To and fro flow of contrast into the esophagus.  Upper GI with KUB 4/26/18:  Normal.  Modified barium swallow study 11/23/2016:No evidence of penetration or aspiration. Speech path note  not available in epic.   Per pt, colonoscopy 7 years ago: normal. Rpt 10 yrs.    ROS:  ROS   Constitutional: no fevers, no chills, no night sweats, no weight loss  ENT: + congestion, + rhinorrhea, + chronic sinus problems  CV: + chest pain, no palpitations  Pulm: + cough, + shortness of breath  Ophtho: No blurry vision, no eye redness  GI: see HPI  Derm: No rash  Heme: No lymphadenopathy, no bruising  MSK: + joint pain, no joint swelling, no Raynauds  : No dysuria, no frequent urination, no blood in urine  Endo: No hot or cold intolerance  Neuro: no dizziness, no syncope, no seizure  Psych: + anxiety, + depression        Medical History:   Past Medical History:   Diagnosis Date    Acid reflux     Anticoagulant long-term use     Eliquis for a-fib    Anxiety disorder     Atrial fibrillation     Back pain     Coronary artery disease     Diabetes     Diabetes mellitus     Diabetes mellitus, type 2     HTN (hypertension)     Hypercholesteremia     Hypertension     Migraine     Myocardial infarction     JENNIFER treated with BiPAP     Oxygen dependent     prn    Respiratory distress     chronic cough    Sleep apnea         Surgical History:   Past Surgical History:   Procedure Laterality Date    ANKLE SURGERY      ANKLE SURGERY Bilateral     ANTERIOR CERVICAL DISCECTOMY W/ FUSION      carpal tunnel both hands      ESOPHAGEAL MANOMETRY WITH MEASUREMENT OF IMPEDANCE N/A 1/27/2020    Procedure: MANOMETRY, ESOPHAGUS, WITH IMPEDANCE MEASUREMENT;  Surgeon: Nellie Emerson MD;  Location: [address];  Service: Endoscopy;  Laterality: N/A;  BMI>50, Bariatric stretcher needed  Hold Narcotics x 1 days   Hold TCA x 1 days  Propofol only. No fentanyl or benzodiazepine during sedation. If additional sedation needed, discuss with Dr. Emerson.  1/20 - pt confirmed appt    ESOPHAGOGASTRODUODENOSCOPY N/A 9/5/2019    Procedure: EGD (ESOPHAGOGASTRODUODENOSCOPY);  Surgeon: Ciro Angulo MD;  Location: [facility]  ENDO;  Service: Endoscopy;  Laterality: N/A;    ESOPHAGOGASTRODUODENOSCOPY N/A 1/23/2020    Procedure: EGD (ESOPHAGOGASTRODUODENOSCOPY);  Surgeon: Nellie Emerson MD;  Location: [facility] ([address] [place] [address]);  Service: Endoscopy;  Laterality: N/A;  EGD with EndoFlip   Reason for 2nd Floor:   Gastroparesis    BMI>50, Bariatric stretcher needed  Full liquids xs 3 days and Clear liquids xs 1 day prior to procedure-BB  Cardiac Clearance and approval to hold Eliquis xs 2 days prior to procedure receive    HERNIA REPAIR      NECK SURGERY      SHOULDER SURGERY      SHOULDER SURGERY Bilateral     UVULOPALATOPHARYNGOPLASTY      WRIST SURGERY Bilateral         Family History:   Family History   Problem Relation Age of Onset    No Known Problems Mother     Cancer Father     Colon cancer Neg Hx     Esophageal cancer Neg Hx     Rectal cancer Neg Hx     Stomach cancer Neg Hx     Ulcerative colitis Neg Hx     Crohn's disease Neg Hx     Celiac disease Neg Hx         Social History:   Social History     Socioeconomic History    Marital status: Legally      Spouse name: Not on file    Number of children: Not on file    Years of education: Not on file    Highest education level: Not on file   Occupational History    Occupation: Disabled   Social Needs    Financial resource strain: Not on file    Food insecurity     Worry: Not on file     Inability: Not on file    Transportation needs     Medical: Not on file     Non-medical: Not on file   Tobacco Use    Smoking status: Never Smoker    Smokeless tobacco: Never Used   Substance and Sexual Activity    Alcohol use: Yes     Comment: seldom    Drug use: Yes     Types: Hydrocodone    Sexual activity: Not Currently   Lifestyle    Physical activity     Days per week: Not on file     Minutes per session: Not on file    Stress: Very much   Relationships    Social connections     Talks on phone: Not on file     Gets together: Not on file     Attends Christianity  service: Not on file     Active member of club or organization: Not on file     Attends meetings of clubs or organizations: Not on file     Relationship status: Not on file   Other Topics Concern    Not on file   Social History Narrative    ** Merged History Encounter **             Review of patient's allergies indicates:   Allergen Reactions    Spironolactone      gynecomatia       Current Outpatient Medications   Medication Sig Dispense Refill    acetaminophen (TYLENOL) 500 MG tablet Take 2 tablets (1,000 mg total) by mouth every 6 (six) hours as needed.  0    albuterol (PROVENTIL) 2.5 mg /3 mL (0.083 %) nebulizer solution Take 2.5 mg by nebulization every 6 (six) hours as needed.  5    albuterol (PROVENTIL/VENTOLIN HFA) 90 mcg/actuation inhaler Inhale 2 puffs into the lungs every 6 (six) hours as needed for Wheezing (COUGH). 18 g 0    ALCOHOL PREP PADS PadM use for insulin and testing blood sugar 100 each 5    ALPRAZolam (XANAX) 0.5 MG tablet Take 1 tablet (0.5 mg total) by mouth daily as needed. 30 tablet 0    ALPRAZolam (XANAX) 0.5 MG tablet TAKE ONE TABLET BY MOUTH DAILY AS NEEDED 30 tablet 0    amLODIPine (NORVASC) 5 MG tablet TAKE ONE TABLET BY MOUTH EVERY MORNING 30 tablet 5 up    aspirin 81 MG Chew Take 81 mg by mouth once daily.      blood sugar diagnostic (TRUE METRIX GLUCOSE TEST STRIP) Strp Test blood sugar twice daily 100 strip 3    blood-glucose meter (TRUE METRIX GLUCOSE METER) Misc Test blood sugar twice daily 1 each 0    BREO ELLIPTA 200-25 mcg/dose DsDv diskus inhaler Inhale 1 puff into the lungs once daily. Controller 1 each 5    carvediloL (COREG) 12.5 MG tablet TAKE ONE TABLET BY MOUTH TWICE DAILY 60 tablet 5    ciprofloxacin HCl (CIPRO) 500 MG tablet TAKE ONE TABLET BY MOUTH TWICE DAILY FOR 1 WEEK      cyanocobalamin (VITAMIN B-12) 100 MCG tablet Take 1 tablet (100 mcg total) by mouth once daily. 30 tablet 5    doxycycline (VIBRA-TABS) 100 MG tablet Take 1 tablet (100 mg  "total) by mouth 2 (two) times daily. 14 tablet 0    EASY COMFORT INSULIN SYRINGE 0.5 mL 31 gauge x 5/16" Syrg       EASY COMFORT INSULIN SYRINGE 1 mL 31 gauge x 5/16 Syrg USE AS DIRECTED FIVE TIMES DAILY 200 each 0    ELIQUIS 5 mg Tab TAKE ONE TABLET BY MOUTH TWICE DAILY 60 tablet 5    ferrous sulfate (FEOSOL) 325 mg (65 mg iron) Tab tablet TAKE ONE TABLET BY MOUTH TWICE DAILY 60 tablet 2    furosemide (LASIX) 40 MG tablet TAKE ONE TABLET BY MOUTH TWICE DAILY 60 tablet 5    furosemide (LASIX) 40 MG tablet Take 1.5 tablets (60 mg total) by mouth 2 (two) times daily. 90 tablet 5    hydrALAZINE (APRESOLINE) 50 MG tablet TAKE ONE TABLET BY MOUTH TWICE DAILY 60 tablet 5    HYDROcodone-acetaminophen (NORCO) 7.5-325 mg per tablet Take 1 tablet by mouth every 12 (twelve) hours as needed for Pain. 60 tablet 0    insulin glargine (LANTUS U-100 INSULIN) 100 unit/mL injection INJECT 63 UNITS UNDER SKIN TWICE DAILY (Patient taking differently: Inject 60 Units into the skin 2 (two) times daily. ) 40 mL 5    insulin lispro (HUMALOG U-100 INSULIN) 100 unit/mL injection INJECT 30 UNITS UNDER SKIN THREE TIMES DAILY BEFORE MEALS 30 mL 5    lancets (ONETOUCH DELICA LANCETS) 33 gauge Misc Test blood sugar twice daily 100 each 3    metFORMIN (GLUCOPHAGE) 1000 MG tablet TAKE ONE TABLET BY MOUTH TWICE DAILY 60 tablet 5    metoclopramide HCl (REGLAN) 10 MG tablet TAKE ONE TABLET BY MOUTH EVERY EVENING 30 tablet 5    montelukast (SINGULAIR) 10 mg tablet TAKE ONE TABLET BY MOUTH EVERY EVENING 30 tablet 5    nortriptyline (PAMELOR) 25 MG capsule Take 1 capsule (25 mg total) by mouth every evening. 30 capsule 11    pantoprazole (PROTONIX) 40 MG tablet TAKE ONE TABLET BY MOUTH EVERY MORNING (Patient taking differently: Take 40 mg by mouth 2 (two) times daily. ) 30 tablet 5    pravastatin (PRAVACHOL) 40 MG tablet TAKE ONE TABLET BY MOUTH EVERY NIGHT 90 tablet 3    VITAMIN C 500 MG tablet TAKE ONE TABLET BY MOUTH TWICE DAILY 60 " "tablet 2    metOLazone (ZAROXOLYN) 2.5 MG tablet Take 1 tablet (2.5 mg total) by mouth once daily. for 3 days 3 tablet 0     No current facility-administered medications for this visit.         Objective Findings:  Vital Signs:  /73   Pulse 80   Ht 5' 9" (1.753 m)   Wt (!) 184.4 kg (406 lb 8.5 oz)   BMI 60.03 kg/m²   Body mass index is 60.03 kg/m².    Physical Exam:  General appearance: alert, cooperative, no distress, obese  HENT: Normocephalic, atraumatic, neck symmetrical, no nasal discharge, Lips, mucosa, and tongue normal; teeth and gums normal  Eyes: conjunctivae/corneas clear, EOM's intact  Lungs: CTA bilaterally in anterior and posterior fields, no wheezes, no crackles.  Heart: Regular rate and rhythm, S1, S2 normal, no murmurs heard  Abdomen: oft, non tender, non distended with positive bowel sounds in all four quadrants. No hepatosplenomegaly, ascites, or mass  Extremities: extremities symmetric; no clubbing, cyanosis, or edema  Integument: Skin color, texture, turgor normal; no rashes; hair distrubution normal  Neurologic: Alert and oriented X 3, normal strength, normal coordination and gait  Psychiatric: no pressured speech; normal affect; no evidence of impaired cognition    Labs:  Lab Results   Component Value Date    WBC 9.90 05/21/2020    HGB 14.0 05/21/2020    HCT 43.7 05/21/2020    MCV 78 (L) 05/21/2020     05/21/2020     Lab Results   Component Value Date    FERRITIN 13.5 (L) 03/19/2020     Lab Results   Component Value Date     03/05/2020    K 4.1 03/05/2020     03/05/2020    CO2 32 (H) 03/05/2020     (H) 03/05/2020    BUN 21 (H) 03/05/2020    CREATININE 1.00 03/05/2020    CALCIUM 8.6 03/05/2020    PROT 6.7 03/05/2020    ALBUMIN 3.7 03/05/2020    BILITOT 0.5 03/05/2020    ALKPHOS 73 03/05/2020    AST 30 03/05/2020    ALT 34 03/05/2020     No results found for: TSH  Lab Results   Component Value Date    SEDRATE 35 (H) 09/13/2017     Lab Results   Component " Value Date    CRP 18.7 (H) 09/13/2017     Lab Results   Component Value Date    HGBA1C 8.9 (H) 05/21/2020       Lab Results   Component Value Date    IRON 40 (L) 03/19/2020    TIBC 405 03/19/2020    FERRITIN 13.5 (L) 03/19/2020         Assessment and Plan:  Jean Chao is a 59 y.o. male with a past medical history of anemia, asthma, anxiety, MDD, HLD, HTN, DM 2, AFib, migraine, JENNIFER, chronic back pain referred to motility clinic for second opinion regarding the following problems:    GERD.  And chronic cough. EGD unrevealing.   No improvement with nexium BID  No improvement with protonix 40 mg twice daily  -Cont protonix 40 mg BID  -Check EGD w BRAVO off PPI   -Discussed weight loss     Chest pain.   Negative cardiac workup   EGD unrevealing. Esophageal manometry with ineffective esophageal motility. TBS with 13 mm BT normal.   -Cont PPI BID  -Check EKG - will increase nortriptyline 50 mg HS for neuromodulation.      Dysphagia.  Previously esophagram with poor clearing of the esophagus with peristalsis and to and flow of contrast into the esophagus. Previous MBSS with no penetration or aspiration (full report not viewable in epic). EGD unrevealing. endoFlip suspicious for borderline EGJ outflow obstruction at 60 mL an normal at 70 mL.   Esophageal manometry with ineffective esophageal motility. Esophagram normal, 13 mm BT passed normally. Achalasia Eckardt score of 4/12.  On norco  -Check EKG - will increase nortriptyline 50 mg HS for neuromodulation.    Abdominal pain. LUQ. Onset: few yrs ago. Sometimes worse with meals.  -Defer to referring GI provider    Gas and bloating. With distention. Onset: few years ago. Worse with meals. Consumes lactose. And diet coke.   -Defer to referring GI provider.    Anemia. Low HGB, normal Iron, normal ferritin. Not on iron.  -Defer to referring GI provider    Anxiety. Depression.   On xanax 0.5 mg daily per pcp.   Has not never seen psych  -Check EKG - will increase  nortriptyline 50 mg HS for neuromodulation.  -Will ref to psychiatry if no improvement w above    JENNIFER. Uses BIPAP nightly.     Chronic back pain.   On norco 7.5 mg BID x few yrs per pcp.   -Discussed the role of narcotic pain medication in motility and functional gastrointestinal disorders.  Will attempt to limit narcotic use.     Obesity   -Discussed weight loss  -Will consider ref to bariatrics    Follow up in about 4 months (around 11/17/2020).    1. Therapeutic drug monitoring    2. Gastroesophageal reflux disease, esophagitis presence not specified    3. Non-cardiac chest pain    4. Dysphagia, unspecified type    5. Obesity, unspecified classification, unspecified obesity type, unspecified whether serious comorbidity present    6. Other chronic pain          Order summary:  Orders Placed This Encounter    EKG 12-lead    Case request GI: ESOPHAGOGASTRODUODENOSCOPY (EGD) with BRAVO           Thank you so much for allowing me to participate in the care of Jean Emerson MD

## 2020-07-17 NOTE — TELEPHONE ENCOUNTER
MOTILITY CLINIC PROCEDURE ORDERS    CLEARANCE FOR PROCEDURES:  No needed     PROCEDURES  EGD with BRAVO 48 hours     FLOOR:  2nd Floor    Reason for 2nd Floor:   BMI>50      PREP  Standard Prep      MEDICATIONS  OFF PPI/H2 Blocker

## 2020-07-21 PROBLEM — R07.9 CHEST PAIN: Status: ACTIVE | Noted: 2020-07-21

## 2020-07-30 ENCOUNTER — TELEPHONE (OUTPATIENT)
Dept: ENDOSCOPY | Facility: HOSPITAL | Age: 60
End: 2020-07-30

## 2020-08-13 ENCOUNTER — TELEPHONE (OUTPATIENT)
Dept: ENDOSCOPY | Facility: HOSPITAL | Age: 60
End: 2020-08-13

## 2020-08-13 NOTE — TELEPHONE ENCOUNTER
Called patient again to see if he has the name of his cardiologist so I can request blood thinner hold approval.  Patient states he doesn't and he forgot to call last week to get the name. He states he is going to call this morning and get the name of his cardiologist. He has my direct line to call me back and let me know that way blood thinner hold request can be sent to MD to move forward with scheduling patient for his ordered procedures with Dr. Emerson

## 2020-10-20 ENCOUNTER — TELEPHONE (OUTPATIENT)
Dept: GASTROENTEROLOGY | Facility: CLINIC | Age: 60
End: 2020-10-20

## 2020-10-20 NOTE — TELEPHONE ENCOUNTER
Initially contacted pt to arrange expected follow up. However, I noticed pt had not yet completed EGD rio Jimenez that Radha attempted to arrange. Pt reminded about this so that we can figure out the next step in plan of care.

## 2020-11-06 PROBLEM — I20.9 ANGINA, CLASS II: Status: ACTIVE | Noted: 2020-11-06

## 2020-11-06 PROBLEM — I50.32 CHRONIC DIASTOLIC CHF (CONGESTIVE HEART FAILURE), NYHA CLASS 2: Status: ACTIVE | Noted: 2020-11-06

## 2020-11-20 PROBLEM — I48.19 PERSISTENT ATRIAL FIBRILLATION: Status: ACTIVE | Noted: 2020-11-20

## 2021-01-03 PROBLEM — Z91.119 LIMITED ADHERENCE TO NUTRITIONAL RECOMMENDATIONS: Status: RESOLVED | Noted: 2020-01-14 | Resolved: 2021-01-03

## 2021-01-03 PROBLEM — R06.02 SOB (SHORTNESS OF BREATH): Status: RESOLVED | Noted: 2019-09-24 | Resolved: 2021-01-03

## 2021-01-03 PROBLEM — R06.03 RESPIRATORY DISTRESS: Status: RESOLVED | Noted: 2019-09-24 | Resolved: 2021-01-03

## 2021-01-03 PROBLEM — R50.9 FEVER: Status: RESOLVED | Noted: 2020-02-21 | Resolved: 2021-01-03

## 2021-01-03 PROBLEM — I20.9 ANGINA, CLASS II: Status: RESOLVED | Noted: 2020-11-06 | Resolved: 2021-01-03

## 2021-01-03 PROBLEM — D64.9 NORMOCYTIC ANEMIA: Status: RESOLVED | Noted: 2020-04-15 | Resolved: 2021-01-03

## 2021-01-31 PROBLEM — I48.19 PERSISTENT ATRIAL FIBRILLATION: Status: RESOLVED | Noted: 2020-11-20 | Resolved: 2021-01-31

## 2021-01-31 PROBLEM — J45.51 SEVERE PERSISTENT ASTHMA WITH ACUTE EXACERBATION: Status: RESOLVED | Noted: 2020-01-14 | Resolved: 2021-01-31

## 2021-01-31 PROBLEM — R07.9 CHEST PAIN: Status: RESOLVED | Noted: 2020-07-21 | Resolved: 2021-01-31

## 2021-02-24 ENCOUNTER — TELEPHONE (OUTPATIENT)
Dept: ENDOSCOPY | Facility: HOSPITAL | Age: 61
End: 2021-02-24

## 2021-02-24 DIAGNOSIS — Z01.818 PRE-OP TESTING: Primary | ICD-10-CM

## 2021-03-30 ENCOUNTER — TELEPHONE (OUTPATIENT)
Dept: GASTROENTEROLOGY | Facility: CLINIC | Age: 61
End: 2021-03-30

## 2021-03-30 ENCOUNTER — LAB VISIT (OUTPATIENT)
Dept: INTERNAL MEDICINE | Facility: CLINIC | Age: 61
End: 2021-03-30
Payer: MEDICARE

## 2021-03-30 DIAGNOSIS — Z01.818 PRE-OP TESTING: ICD-10-CM

## 2021-03-30 PROCEDURE — U0005 INFEC AGEN DETEC AMPLI PROBE: HCPCS | Performed by: FAMILY MEDICINE

## 2021-03-30 PROCEDURE — U0003 INFECTIOUS AGENT DETECTION BY NUCLEIC ACID (DNA OR RNA); SEVERE ACUTE RESPIRATORY SYNDROME CORONAVIRUS 2 (SARS-COV-2) (CORONAVIRUS DISEASE [COVID-19]), AMPLIFIED PROBE TECHNIQUE, MAKING USE OF HIGH THROUGHPUT TECHNOLOGIES AS DESCRIBED BY CMS-2020-01-R: HCPCS | Performed by: FAMILY MEDICINE

## 2021-03-31 ENCOUNTER — ANESTHESIA EVENT (OUTPATIENT)
Dept: ENDOSCOPY | Facility: HOSPITAL | Age: 61
End: 2021-03-31
Payer: MEDICARE

## 2021-03-31 LAB — SARS-COV-2 RNA RESP QL NAA+PROBE: NOT DETECTED

## 2021-04-01 ENCOUNTER — ANESTHESIA (OUTPATIENT)
Dept: ENDOSCOPY | Facility: HOSPITAL | Age: 61
End: 2021-04-01
Payer: MEDICARE

## 2021-04-01 ENCOUNTER — HOSPITAL ENCOUNTER (OUTPATIENT)
Facility: HOSPITAL | Age: 61
Discharge: HOME | End: 2021-04-01
Attending: INTERNAL MEDICINE | Admitting: INTERNAL MEDICINE
Payer: MEDICARE

## 2021-04-01 VITALS
BODY MASS INDEX: 46.65 KG/M2 | SYSTOLIC BLOOD PRESSURE: 156 MMHG | DIASTOLIC BLOOD PRESSURE: 70 MMHG | HEART RATE: 75 BPM | TEMPERATURE: 98 F | OXYGEN SATURATION: 96 % | HEIGHT: 69 IN | RESPIRATION RATE: 18 BRPM | WEIGHT: 315 LBS

## 2021-04-01 DIAGNOSIS — K21.9 GASTROESOPHAGEAL REFLUX DISEASE, UNSPECIFIED WHETHER ESOPHAGITIS PRESENT: Primary | ICD-10-CM

## 2021-04-01 DIAGNOSIS — K21.9 GERD (GASTROESOPHAGEAL REFLUX DISEASE): ICD-10-CM

## 2021-04-01 LAB
POCT GLUCOSE: 175 MG/DL (ref 70–110)
POCT GLUCOSE: 183 MG/DL (ref 70–110)

## 2021-04-01 PROCEDURE — 43239 PR EGD, FLEX, W/BIOPSY, SGL/MULTI: ICD-10-PCS | Mod: ,,, | Performed by: INTERNAL MEDICINE

## 2021-04-01 PROCEDURE — 43239 EGD BIOPSY SINGLE/MULTIPLE: CPT | Mod: ,,, | Performed by: INTERNAL MEDICINE

## 2021-04-01 PROCEDURE — D9220A PRA ANESTHESIA: ICD-10-PCS | Mod: CRNA,,, | Performed by: NURSE ANESTHETIST, CERTIFIED REGISTERED

## 2021-04-01 PROCEDURE — 43239 EGD BIOPSY SINGLE/MULTIPLE: CPT | Performed by: INTERNAL MEDICINE

## 2021-04-01 PROCEDURE — 37000009 HC ANESTHESIA EA ADD 15 MINS: Performed by: INTERNAL MEDICINE

## 2021-04-01 PROCEDURE — D9220A PRA ANESTHESIA: Mod: ANES,,, | Performed by: ANESTHESIOLOGY

## 2021-04-01 PROCEDURE — 88305 TISSUE EXAM BY PATHOLOGIST: CPT | Mod: 26,,, | Performed by: PATHOLOGY

## 2021-04-01 PROCEDURE — 88342 CHG IMMUNOCYTOCHEMISTRY: ICD-10-PCS | Mod: 26,,, | Performed by: PATHOLOGY

## 2021-04-01 PROCEDURE — 27201012 HC FORCEPS, HOT/COLD, DISP: Performed by: INTERNAL MEDICINE

## 2021-04-01 PROCEDURE — 88342 IMHCHEM/IMCYTCHM 1ST ANTB: CPT | Mod: 26,,, | Performed by: PATHOLOGY

## 2021-04-01 PROCEDURE — 25000003 PHARM REV CODE 250: Performed by: NURSE ANESTHETIST, CERTIFIED REGISTERED

## 2021-04-01 PROCEDURE — 63600175 PHARM REV CODE 636 W HCPCS: Performed by: NURSE ANESTHETIST, CERTIFIED REGISTERED

## 2021-04-01 PROCEDURE — 91035 G-ESOPH REFLX TST W/ELECTROD: CPT | Performed by: INTERNAL MEDICINE

## 2021-04-01 PROCEDURE — 82962 GLUCOSE BLOOD TEST: CPT | Performed by: INTERNAL MEDICINE

## 2021-04-01 PROCEDURE — D9220A PRA ANESTHESIA: Mod: CRNA,,, | Performed by: NURSE ANESTHETIST, CERTIFIED REGISTERED

## 2021-04-01 PROCEDURE — D9220A PRA ANESTHESIA: ICD-10-PCS | Mod: ANES,,, | Performed by: ANESTHESIOLOGY

## 2021-04-01 PROCEDURE — 37000008 HC ANESTHESIA 1ST 15 MINUTES: Performed by: INTERNAL MEDICINE

## 2021-04-01 PROCEDURE — 88305 TISSUE EXAM BY PATHOLOGIST: CPT | Mod: 59 | Performed by: PATHOLOGY

## 2021-04-01 PROCEDURE — 88305 TISSUE EXAM BY PATHOLOGIST: ICD-10-PCS | Mod: 26,,, | Performed by: PATHOLOGY

## 2021-04-01 PROCEDURE — 88342 IMHCHEM/IMCYTCHM 1ST ANTB: CPT | Performed by: PATHOLOGY

## 2021-04-01 PROCEDURE — 27200942: Performed by: INTERNAL MEDICINE

## 2021-04-01 PROCEDURE — 25000003 PHARM REV CODE 250: Performed by: INTERNAL MEDICINE

## 2021-04-01 RX ORDER — LIDOCAINE HYDROCHLORIDE 20 MG/ML
SOLUTION OROPHARYNGEAL
Status: DISCONTINUED | OUTPATIENT
Start: 2021-04-01 | End: 2021-04-01

## 2021-04-01 RX ORDER — PROPOFOL 10 MG/ML
VIAL (ML) INTRAVENOUS CONTINUOUS PRN
Status: DISCONTINUED | OUTPATIENT
Start: 2021-04-01 | End: 2021-04-01

## 2021-04-01 RX ORDER — PROPOFOL 10 MG/ML
VIAL (ML) INTRAVENOUS
Status: DISCONTINUED | OUTPATIENT
Start: 2021-04-01 | End: 2021-04-01

## 2021-04-01 RX ORDER — LIDOCAINE HCL/PF 100 MG/5ML
SYRINGE (ML) INTRAVENOUS
Status: DISCONTINUED | OUTPATIENT
Start: 2021-04-01 | End: 2021-04-01

## 2021-04-01 RX ORDER — SODIUM CHLORIDE 0.9 % (FLUSH) 0.9 %
3 SYRINGE (ML) INJECTION
Status: DISCONTINUED | OUTPATIENT
Start: 2021-04-01 | End: 2021-04-01 | Stop reason: HOSPADM

## 2021-04-01 RX ORDER — SODIUM CHLORIDE 0.9 % (FLUSH) 0.9 %
10 SYRINGE (ML) INJECTION
Status: DISCONTINUED | OUTPATIENT
Start: 2021-04-01 | End: 2021-04-01 | Stop reason: HOSPADM

## 2021-04-01 RX ORDER — SODIUM CHLORIDE 9 MG/ML
INJECTION, SOLUTION INTRAVENOUS CONTINUOUS
Status: DISCONTINUED | OUTPATIENT
Start: 2021-04-01 | End: 2021-04-01 | Stop reason: HOSPADM

## 2021-04-01 RX ORDER — FENTANYL CITRATE 50 UG/ML
25 INJECTION, SOLUTION INTRAMUSCULAR; INTRAVENOUS EVERY 5 MIN PRN
Status: DISCONTINUED | OUTPATIENT
Start: 2021-04-01 | End: 2021-04-01 | Stop reason: HOSPADM

## 2021-04-01 RX ORDER — KETAMINE HCL IN 0.9 % NACL 50 MG/5 ML
SYRINGE (ML) INTRAVENOUS
Status: DISCONTINUED | OUTPATIENT
Start: 2021-04-01 | End: 2021-04-01

## 2021-04-01 RX ADMIN — SODIUM CHLORIDE: 0.9 INJECTION, SOLUTION INTRAVENOUS at 08:04

## 2021-04-01 RX ADMIN — Medication 100 MG: at 09:04

## 2021-04-01 RX ADMIN — Medication 20 MG: at 09:04

## 2021-04-01 RX ADMIN — PROPOFOL 100 MCG/KG/MIN: 10 INJECTION, EMULSION INTRAVENOUS at 09:04

## 2021-04-01 RX ADMIN — PROPOFOL 50 MG: 10 INJECTION, EMULSION INTRAVENOUS at 09:04

## 2021-04-01 RX ADMIN — Medication 10 MG: at 09:04

## 2021-04-01 RX ADMIN — LIDOCAINE HYDROCHLORIDE 15 ML: 20 SOLUTION ORAL; TOPICAL at 09:04

## 2021-04-08 LAB
FINAL PATHOLOGIC DIAGNOSIS: NORMAL
GROSS: NORMAL
Lab: NORMAL

## 2021-04-12 ENCOUNTER — NURSE TRIAGE (OUTPATIENT)
Dept: ADMINISTRATIVE | Facility: CLINIC | Age: 61
End: 2021-04-12

## 2021-04-16 ENCOUNTER — TELEPHONE (OUTPATIENT)
Dept: GASTROENTEROLOGY | Facility: CLINIC | Age: 61
End: 2021-04-16

## 2021-04-16 PROCEDURE — 91035 PR GERD TST W/ MUCOS PH ELECTROD: ICD-10-PCS | Mod: 26,,, | Performed by: INTERNAL MEDICINE

## 2021-04-16 PROCEDURE — 91035 G-ESOPH REFLX TST W/ELECTROD: CPT | Mod: 26,,, | Performed by: INTERNAL MEDICINE

## 2021-04-19 ENCOUNTER — TELEPHONE (OUTPATIENT)
Dept: GASTROENTEROLOGY | Facility: CLINIC | Age: 61
End: 2021-04-19

## 2021-04-20 ENCOUNTER — TELEPHONE (OUTPATIENT)
Dept: GASTROENTEROLOGY | Facility: CLINIC | Age: 61
End: 2021-04-20

## 2021-05-11 ENCOUNTER — TELEPHONE (OUTPATIENT)
Dept: GASTROENTEROLOGY | Facility: CLINIC | Age: 61
End: 2021-05-11

## 2021-05-12 ENCOUNTER — LAB VISIT (OUTPATIENT)
Dept: LAB | Facility: HOSPITAL | Age: 61
End: 2021-05-12
Attending: INTERNAL MEDICINE
Payer: MEDICARE

## 2021-05-12 ENCOUNTER — TELEPHONE (OUTPATIENT)
Dept: GASTROENTEROLOGY | Facility: CLINIC | Age: 61
End: 2021-05-12

## 2021-05-12 ENCOUNTER — OFFICE VISIT (OUTPATIENT)
Dept: GASTROENTEROLOGY | Facility: CLINIC | Age: 61
End: 2021-05-12
Payer: MEDICARE

## 2021-05-12 VITALS
HEART RATE: 65 BPM | DIASTOLIC BLOOD PRESSURE: 68 MMHG | BODY MASS INDEX: 46.65 KG/M2 | OXYGEN SATURATION: 95 % | WEIGHT: 315 LBS | SYSTOLIC BLOOD PRESSURE: 133 MMHG | HEIGHT: 69 IN

## 2021-05-12 DIAGNOSIS — K21.9 GASTROESOPHAGEAL REFLUX DISEASE, UNSPECIFIED WHETHER ESOPHAGITIS PRESENT: ICD-10-CM

## 2021-05-12 DIAGNOSIS — R68.81 EARLY SATIETY: ICD-10-CM

## 2021-05-12 DIAGNOSIS — E66.9 OBESITY, UNSPECIFIED CLASSIFICATION, UNSPECIFIED OBESITY TYPE, UNSPECIFIED WHETHER SERIOUS COMORBIDITY PRESENT: ICD-10-CM

## 2021-05-12 DIAGNOSIS — R63.5 WEIGHT GAIN: ICD-10-CM

## 2021-05-12 DIAGNOSIS — R63.5 WEIGHT GAIN: Primary | ICD-10-CM

## 2021-05-12 DIAGNOSIS — D64.9 ANEMIA, UNSPECIFIED TYPE: ICD-10-CM

## 2021-05-12 LAB
BASOPHILS # BLD AUTO: 0.07 K/UL (ref 0–0.2)
BASOPHILS NFR BLD: 0.8 % (ref 0–1.9)
DIFFERENTIAL METHOD: ABNORMAL
EOSINOPHIL # BLD AUTO: 0.2 K/UL (ref 0–0.5)
EOSINOPHIL NFR BLD: 1.7 % (ref 0–8)
ERYTHROCYTE [DISTWIDTH] IN BLOOD BY AUTOMATED COUNT: 13.2 % (ref 11.5–14.5)
HCT VFR BLD AUTO: 42.6 % (ref 40–54)
HGB BLD-MCNC: 13 G/DL (ref 14–18)
IMM GRANULOCYTES # BLD AUTO: 0.05 K/UL (ref 0–0.04)
IMM GRANULOCYTES NFR BLD AUTO: 0.5 % (ref 0–0.5)
LYMPHOCYTES # BLD AUTO: 2.7 K/UL (ref 1–4.8)
LYMPHOCYTES NFR BLD: 29.4 % (ref 18–48)
MCH RBC QN AUTO: 26.7 PG (ref 27–31)
MCHC RBC AUTO-ENTMCNC: 30.5 G/DL (ref 32–36)
MCV RBC AUTO: 88 FL (ref 82–98)
MONOCYTES # BLD AUTO: 0.7 K/UL (ref 0.3–1)
MONOCYTES NFR BLD: 8 % (ref 4–15)
NEUTROPHILS # BLD AUTO: 5.5 K/UL (ref 1.8–7.7)
NEUTROPHILS NFR BLD: 59.6 % (ref 38–73)
NRBC BLD-RTO: 0 /100 WBC
PLATELET # BLD AUTO: 248 K/UL (ref 150–450)
PMV BLD AUTO: 11.3 FL (ref 9.2–12.9)
RBC # BLD AUTO: 4.87 M/UL (ref 4.6–6.2)
WBC # BLD AUTO: 9.22 K/UL (ref 3.9–12.7)

## 2021-05-12 PROCEDURE — 99214 OFFICE O/P EST MOD 30 MIN: CPT | Mod: S$GLB,,, | Performed by: INTERNAL MEDICINE

## 2021-05-12 PROCEDURE — 82728 ASSAY OF FERRITIN: CPT | Performed by: INTERNAL MEDICINE

## 2021-05-12 PROCEDURE — 84443 ASSAY THYROID STIM HORMONE: CPT | Performed by: INTERNAL MEDICINE

## 2021-05-12 PROCEDURE — 36415 COLL VENOUS BLD VENIPUNCTURE: CPT | Performed by: INTERNAL MEDICINE

## 2021-05-12 PROCEDURE — 99214 PR OFFICE/OUTPT VISIT, EST, LEVL IV, 30-39 MIN: ICD-10-PCS | Mod: S$GLB,,, | Performed by: INTERNAL MEDICINE

## 2021-05-12 PROCEDURE — 99999 PR PBB SHADOW E&M-EST. PATIENT-LVL V: ICD-10-PCS | Mod: PBBFAC,,, | Performed by: INTERNAL MEDICINE

## 2021-05-12 PROCEDURE — 1125F AMNT PAIN NOTED PAIN PRSNT: CPT | Mod: S$GLB,,, | Performed by: INTERNAL MEDICINE

## 2021-05-12 PROCEDURE — 1125F PR PAIN SEVERITY QUANTIFIED, PAIN PRESENT: ICD-10-PCS | Mod: S$GLB,,, | Performed by: INTERNAL MEDICINE

## 2021-05-12 PROCEDURE — 3008F BODY MASS INDEX DOCD: CPT | Mod: CPTII,S$GLB,, | Performed by: INTERNAL MEDICINE

## 2021-05-12 PROCEDURE — 83540 ASSAY OF IRON: CPT | Performed by: INTERNAL MEDICINE

## 2021-05-12 PROCEDURE — 3008F PR BODY MASS INDEX (BMI) DOCUMENTED: ICD-10-PCS | Mod: CPTII,S$GLB,, | Performed by: INTERNAL MEDICINE

## 2021-05-12 PROCEDURE — 85025 COMPLETE CBC W/AUTO DIFF WBC: CPT | Performed by: INTERNAL MEDICINE

## 2021-05-12 PROCEDURE — 99999 PR PBB SHADOW E&M-EST. PATIENT-LVL V: CPT | Mod: PBBFAC,,, | Performed by: INTERNAL MEDICINE

## 2021-05-12 RX ORDER — ESOMEPRAZOLE MAGNESIUM 40 MG/1
40 CAPSULE, DELAYED RELEASE ORAL
Qty: 60 CAPSULE | Refills: 11 | Status: SHIPPED | OUTPATIENT
Start: 2021-05-12 | End: 2021-12-16 | Stop reason: ALTCHOICE

## 2021-05-13 LAB
FERRITIN SERPL-MCNC: 72 NG/ML (ref 20–300)
IRON SERPL-MCNC: 47 UG/DL (ref 45–160)
SATURATED IRON: 13 % (ref 20–50)
TOTAL IRON BINDING CAPACITY: 358 UG/DL (ref 250–450)
TRANSFERRIN SERPL-MCNC: 242 MG/DL (ref 200–375)
TSH SERPL DL<=0.005 MIU/L-ACNC: 1.86 UIU/ML (ref 0.4–4)

## 2021-05-14 ENCOUNTER — TELEPHONE (OUTPATIENT)
Dept: GASTROENTEROLOGY | Facility: CLINIC | Age: 61
End: 2021-05-14

## 2021-05-21 ENCOUNTER — TELEPHONE (OUTPATIENT)
Dept: SURGERY | Facility: CLINIC | Age: 61
End: 2021-05-21

## 2021-05-24 PROBLEM — E66.2 CLASS 3 OBESITY WITH ALVEOLAR HYPOVENTILATION, SERIOUS COMORBIDITY, AND BODY MASS INDEX (BMI) OF 60.0 TO 69.9 IN ADULT: Chronic | Status: ACTIVE | Noted: 2019-12-23

## 2021-05-31 PROBLEM — Z51.81 THERAPEUTIC DRUG MONITORING: Status: ACTIVE | Noted: 2021-05-31

## 2021-05-31 PROBLEM — Z71.3 DIETARY COUNSELING: Status: ACTIVE | Noted: 2021-05-31

## 2021-11-02 PROBLEM — I50.33 DIASTOLIC DYSFUNCTION WITH ACUTE ON CHRONIC HEART FAILURE: Chronic | Status: RESOLVED | Noted: 2017-04-10 | Resolved: 2021-11-02

## 2021-11-02 PROBLEM — F11.20 OPIOID DEPENDENCE, CONTINUOUS: Chronic | Status: ACTIVE | Noted: 2021-11-02

## 2021-11-02 PROBLEM — I50.32 CHRONIC DIASTOLIC CHF (CONGESTIVE HEART FAILURE), NYHA CLASS 2: Chronic | Status: ACTIVE | Noted: 2020-11-06

## 2021-11-02 PROBLEM — E11.69 HYPERLIPIDEMIA ASSOCIATED WITH TYPE 2 DIABETES MELLITUS: Chronic | Status: ACTIVE | Noted: 2017-04-10

## 2021-11-02 PROBLEM — Z79.01 LONG TERM (CURRENT) USE OF ANTICOAGULANTS: Chronic | Status: ACTIVE | Noted: 2017-04-10

## 2021-11-02 PROBLEM — F13.20 BENZODIAZEPINE DEPENDENCE, CONTINUOUS: Chronic | Status: ACTIVE | Noted: 2021-11-02

## 2021-11-02 PROBLEM — N28.9 ACUTE RENAL INSUFFICIENCY: Status: RESOLVED | Noted: 2019-09-24 | Resolved: 2021-11-02

## 2021-11-02 PROBLEM — E66.2 OBESITY HYPOVENTILATION SYNDROME: Chronic | Status: RESOLVED | Noted: 2019-09-06 | Resolved: 2021-11-02

## 2021-12-21 PROBLEM — I48.11 LONGSTANDING PERSISTENT ATRIAL FIBRILLATION: Status: ACTIVE | Noted: 2021-12-21

## 2022-03-05 PROBLEM — I87.8 VENOUS STASIS: Status: ACTIVE | Noted: 2022-03-05

## 2022-03-05 PROBLEM — I50.9 CHF (CONGESTIVE HEART FAILURE): Status: ACTIVE | Noted: 2022-03-05

## 2022-03-16 PROBLEM — M54.50 ACUTE LEFT-SIDED LOW BACK PAIN WITHOUT SCIATICA: Status: ACTIVE | Noted: 2022-03-16

## 2022-03-16 PROBLEM — R33.9 URINARY RETENTION WITH INCOMPLETE BLADDER EMPTYING: Status: ACTIVE | Noted: 2022-03-16

## 2022-03-16 PROBLEM — E66.2 CLASS 3 OBESITY WITH ALVEOLAR HYPOVENTILATION, SERIOUS COMORBIDITY, AND BODY MASS INDEX (BMI) OF 60.0 TO 69.9 IN ADULT: Status: ACTIVE | Noted: 2019-12-23

## 2022-03-16 PROBLEM — M54.9 LEFT-SIDED BACK PAIN: Status: ACTIVE | Noted: 2022-03-16

## 2022-03-25 ENCOUNTER — HOSPITAL ENCOUNTER (OUTPATIENT)
Dept: RADIOLOGY | Facility: HOSPITAL | Age: 62
Discharge: HOME | End: 2022-03-25
Attending: PAIN MEDICINE
Payer: MEDICARE

## 2022-03-25 DIAGNOSIS — M54.16 LUMBAR RADICULOPATHY: ICD-10-CM

## 2022-04-03 PROBLEM — R93.3 ABNORMAL BARIUM SWALLOW: Status: RESOLVED | Noted: 2019-09-04 | Resolved: 2022-04-03

## 2022-04-03 PROBLEM — M54.9 LEFT-SIDED BACK PAIN: Status: RESOLVED | Noted: 2022-03-16 | Resolved: 2022-04-03

## 2022-04-03 PROBLEM — Z71.3 DIETARY COUNSELING: Status: RESOLVED | Noted: 2021-05-31 | Resolved: 2022-04-03

## 2022-04-03 PROBLEM — N62 DRUG-INDUCED GYNECOMASTIA: Status: RESOLVED | Noted: 2018-06-27 | Resolved: 2022-04-03

## 2022-04-03 PROBLEM — R33.9 URINARY RETENTION WITH INCOMPLETE BLADDER EMPTYING: Status: RESOLVED | Noted: 2022-03-16 | Resolved: 2022-04-03

## 2022-04-03 PROBLEM — F13.20 BENZODIAZEPINE DEPENDENCE, CONTINUOUS: Chronic | Status: RESOLVED | Noted: 2021-11-02 | Resolved: 2022-04-03

## 2022-04-03 PROBLEM — Z51.81 THERAPEUTIC DRUG MONITORING: Status: RESOLVED | Noted: 2021-05-31 | Resolved: 2022-04-03

## 2022-04-03 PROBLEM — M54.50 ACUTE LEFT-SIDED LOW BACK PAIN WITHOUT SCIATICA: Status: RESOLVED | Noted: 2022-03-16 | Resolved: 2022-04-03

## 2022-04-03 PROBLEM — I50.9 CHF (CONGESTIVE HEART FAILURE): Status: RESOLVED | Noted: 2022-03-05 | Resolved: 2022-04-03

## 2022-04-03 PROBLEM — I48.11 LONGSTANDING PERSISTENT ATRIAL FIBRILLATION: Status: RESOLVED | Noted: 2021-12-21 | Resolved: 2022-04-03

## 2022-04-03 PROBLEM — T50.905A DRUG-INDUCED GYNECOMASTIA: Status: RESOLVED | Noted: 2018-06-27 | Resolved: 2022-04-03

## 2022-04-03 PROBLEM — I87.8 VENOUS STASIS: Status: RESOLVED | Noted: 2022-03-05 | Resolved: 2022-04-03

## 2022-04-03 PROBLEM — R13.10 DYSPHAGIA: Status: RESOLVED | Noted: 2019-09-03 | Resolved: 2022-04-03

## 2022-04-20 PROBLEM — I50.33: Status: ACTIVE | Noted: 2020-11-06

## 2022-04-21 PROBLEM — J96.21 ACUTE ON CHRONIC RESPIRATORY FAILURE WITH HYPOXIA AND HYPERCAPNIA: Status: ACTIVE | Noted: 2019-10-15

## 2022-04-21 PROBLEM — J96.22 ACUTE ON CHRONIC RESPIRATORY FAILURE WITH HYPOXIA AND HYPERCAPNIA: Status: ACTIVE | Noted: 2019-10-15

## 2022-05-02 PROBLEM — L03.116 CELLULITIS OF LEFT LOWER EXTREMITY: Status: RESOLVED | Noted: 2017-04-10 | Resolved: 2022-05-02

## 2022-08-31 ENCOUNTER — HOSPITAL ENCOUNTER (OUTPATIENT)
Dept: RADIOLOGY | Facility: HOSPITAL | Age: 62
Discharge: HOME | End: 2022-08-31
Attending: PAIN MEDICINE
Payer: MEDICARE

## 2022-08-31 DIAGNOSIS — M54.16 LUMBAR RADICULOPATHY: ICD-10-CM

## 2022-08-31 DIAGNOSIS — M87.00 AVN (AVASCULAR NECROSIS OF BONE): ICD-10-CM

## 2023-05-01 RX ORDER — ESOMEPRAZOLE MAGNESIUM 40 MG/1
CAPSULE, DELAYED RELEASE ORAL
Qty: 60 CAPSULE | Refills: 11 | OUTPATIENT
Start: 2023-05-01

## 2024-03-13 PROBLEM — G56.03 BILATERAL CARPAL TUNNEL SYNDROME: Status: ACTIVE | Noted: 2024-03-13

## (undated) DEVICE — KIT ENDOKIT COMPLIANCE CUSTOM